# Patient Record
Sex: FEMALE | Race: WHITE | Employment: OTHER | URBAN - METROPOLITAN AREA
[De-identification: names, ages, dates, MRNs, and addresses within clinical notes are randomized per-mention and may not be internally consistent; named-entity substitution may affect disease eponyms.]

---

## 2018-05-09 PROBLEM — M15.9 PRIMARY OSTEOARTHRITIS INVOLVING MULTIPLE JOINTS: Status: ACTIVE | Noted: 2017-09-11

## 2018-05-09 PROBLEM — E66.01 SEVERE OBESITY (BMI 35.0-39.9) WITH COMORBIDITY (HCC): Status: ACTIVE | Noted: 2018-05-09

## 2019-07-16 PROBLEM — Z85.528 HISTORY OF RENAL CELL CARCINOMA: Status: ACTIVE | Noted: 2019-03-12

## 2020-08-11 VITALS — HEIGHT: 64 IN | BODY MASS INDEX: 34.66 KG/M2 | WEIGHT: 203 LBS

## 2020-08-11 RX ORDER — ZOSTER VACCINE RECOMBINANT, ADJUVANTED 50 MCG/0.5
0.5 KIT INTRAMUSCULAR ONCE
COMMUNITY
End: 2021-07-30

## 2020-08-18 ENCOUNTER — OFFICE VISIT (OUTPATIENT)
Dept: ORTHOPEDIC SURGERY | Age: 75
End: 2020-08-18

## 2020-08-18 VITALS — HEIGHT: 64 IN | BODY MASS INDEX: 36.7 KG/M2 | WEIGHT: 215 LBS

## 2020-08-18 DIAGNOSIS — M25.561 RIGHT KNEE PAIN, UNSPECIFIED CHRONICITY: Primary | ICD-10-CM

## 2020-08-18 DIAGNOSIS — M25.561 CHRONIC PAIN OF RIGHT KNEE: ICD-10-CM

## 2020-08-18 DIAGNOSIS — M17.11 PRIMARY OSTEOARTHRITIS OF RIGHT KNEE: Primary | ICD-10-CM

## 2020-08-18 DIAGNOSIS — G89.29 CHRONIC PAIN OF RIGHT KNEE: ICD-10-CM

## 2020-08-18 RX ORDER — OXYCODONE AND ACETAMINOPHEN 5; 325 MG/1; MG/1
1 TABLET ORAL
Qty: 30 TAB | Refills: 0 | Status: SHIPPED | OUTPATIENT
Start: 2020-08-18 | End: 2020-08-25

## 2020-08-18 RX ORDER — CEPHALEXIN 500 MG/1
500 CAPSULE ORAL 4 TIMES DAILY
Qty: 28 CAP | Refills: 0 | Status: SHIPPED | OUTPATIENT
Start: 2020-08-18 | End: 2020-08-25

## 2020-08-18 NOTE — PROGRESS NOTES
[employer] and Sports Medicine  Preop Visit    Subjective: Dani Paz is a 77 y.o. female who presents today for preoperative visit in preparation for upcoming right total knee replacement to be performed by Dr. Mayra Kim, on an outpatient  basis. Xrays and additional studies, as appropriate, have been reviewed. Pt currently without new complaint. Past Medical History:   Diagnosis Date    Arthritis     Colon polyp     Kidney stones     Piercing     Renal cell carcinoma of right kidney ([address] 10/6/95    Unknown Pathological Stage RCC with unknown FG s/p right radical nephrectomy at [facility] in      Renal disorder        Past Surgical History:   Procedure Laterality Date    HX CATARACT REMOVAL      HX CATARACT REMOVAL  10/2013    Right Eye    HX  SECTION      HX CHOLECYSTECTOMY      HX NEPHRECTOMY Right 10/06/95    Dr. Elida Hooker, [facility] [place]    HX TUBAL LIGATION         Current Outpatient Medications   Medication Sig Dispense Refill    cephALEXin (KEFLEX) 500 mg capsule Take 1 Cap by mouth four (4) times daily for 7 days. Start today 28 Cap 0    oxyCODONE-acetaminophen (Percocet) 5-325 mg per tablet Take 1 Tab by mouth every four to six (4-6) hours as needed for Pain for up to 7 days. Max Daily Amount: 6 Tabs. 30 Tab 0    atorvastatin (LIPITOR) 10 mg tablet Take 10 mg by mouth At bedtime.  aspirin delayed-release 81 mg tablet Take  by mouth daily.  omega-3 fatty acids-vitamin e (FISH OIL) 1,000 mg cap Take 1 Cap by mouth.  varicella-zoster recombinant, PF, (Shingrix, PF,) 50 mcg/0.5 mL susr injection 0.5 mL by IntraMUSCular route once.  trospium (SANCTURA) 20 mg tablet Take 1 Tab by mouth two (2) times a day. 90 Tab 3    naproxen sodium (ALEVE) 220 mg cap Take  by mouth.  Glucosamine &Chondroit-MV-Min3 884-079-96-0.5 mg tab Take  by mouth.  multivitamins-minerals-lutein (CENTRUM SILVER) tab Take  by mouth.          No Known Allergies    ROS:    Patient is a pleasant appearing individual, appropriately dressed, well hydrated, well nourished, who is alert, appropriately oriented for age, and in no acute distress with a limp gait and normal affect who does not appear to be in any significant pain. Remainder of ROS as per HPI. Objective:     Physical Exam:  VSS AFEB    Left knee - Neurovascularly intact with good cap refill, full range of motion and full strength, well healed incision noted, no swelling, no erythema, no instability. Right knee - Decrease range of motion with flexion, Some crepitation, Grossly neurovascularly intact, Good cap refill, he does have several small \"bug bites,\" that she has obtained over the last several days that do not appear to be infected and are located primarily on her distal shin and ankle, Moderate swelling, No gross instability, Some quadriceps weakness       Studies to date:    Xrays: And all diagnostic studies have been reviewed    Labs: Reviewed    General Medicine evaluation: Cleared for surgery from PCP standpoint    Addistional preoperative evaluations: Urologic oncology has cleared her from a standpoint of her previous renal cancer. They states she has no contraindications to proceeding with surgery. Post operative Antibiotics: Keflex she will be started on prophylactically today with regard to small bug bites on her shins and ankles  Post operative Pain Medications: Oxycodone  Post operative Blood thinning meds: Aspirin    Post operative medications, DME and physical therapy prescriptions have been provided. (Meds sent to pharmacy)    Assessment:   Primary osteoarthritis of right knee [M17.11]     Jassi Deloen is a 77 y.o. female who is planning to undergo a right total knee replacement to be performed by Dr. Pilar Miller in the near future. We will plan to proceed on an outpatient basis. At this time, they are an appropriate candidate for surgery.  The risks, benefits, complications and alternatives have been outlined with the patient at length and they voice an understanding. Based on the fact that we agree that the potential benefits outweigh the potential lists, we will plan to proceed as discussed. Ms. Marycarmen Maxwell has a reminder for a \"due or due soon\" health maintenance. I have asked that she contact her primary care provider for follow-up on this health maintenance. Plan:   -Proceed with right TKA to be performed by DR. Waller at [facility] on an outpatient basis. -Preoperative instructions have been reviewed with and provided to the patient, at length  -Patient voices understanding that any skin abnormality to the skin at the site of the involved area of planned surgery may result in cancellation and/or postponement of surgery.  -Physical therapy  to start shortly after surgery.  -Follow up 1 week postoperatively with me for close follow up.     Toya Beth, MS, PA-C

## 2020-08-31 ENCOUNTER — OFFICE VISIT (OUTPATIENT)
Dept: ORTHOPEDIC SURGERY | Age: 75
End: 2020-08-31
Payer: MEDICARE

## 2020-08-31 DIAGNOSIS — G89.29 CHRONIC PAIN OF RIGHT KNEE: ICD-10-CM

## 2020-08-31 DIAGNOSIS — M17.11 PRIMARY OSTEOARTHRITIS OF RIGHT KNEE: ICD-10-CM

## 2020-08-31 DIAGNOSIS — M25.561 CHRONIC PAIN OF RIGHT KNEE: ICD-10-CM

## 2020-08-31 DIAGNOSIS — Z96.651 HISTORY OF TOTAL RIGHT KNEE REPLACEMENT: Primary | ICD-10-CM

## 2020-08-31 PROCEDURE — 99024 POSTOP FOLLOW-UP VISIT: CPT | Performed by: PHYSICIAN ASSISTANT

## 2020-08-31 RX ORDER — OXYCODONE AND ACETAMINOPHEN 5; 325 MG/1; MG/1
1 TABLET ORAL
Qty: 30 TAB | Refills: 0 | Status: SHIPPED | OUTPATIENT
Start: 2020-08-31 | End: 2020-09-07

## 2020-08-31 NOTE — PROGRESS NOTES
[facility] and [facility]  Total Knee Replacement Follow up    Subjective: Bla Gill is a 77 y.o. female presents for postop care status post right total knee arthroplasty performed on 24 August 2020 by Dr. Jovana Choi. Pain is generally well controlled. Appetite is returning to normal. Patient has begun physical therapy. Dressing and stockings are in place. Pt has been taking blood thinning medication as recommended. Ambulating without difficulty. No problems with wound. Otherwise without complaint. ROS  Patient is a pleasant appearing individual, appropriately dressed, well hydrated, well nourished, who is alert, appropriately oriented for age, and in no acute distress with a limp gait and normal affect who does not appear to be in any significant pain. Objective:     VSS AFEB  Left knee - Neurovascularly intact with good cap refill, full range of motion and full strength,  no swelling, no erythema, no instability. Right knee - Decrease range of motion with flexion, Some crepitation, Grossly neurovascularly intact, Good cap refill, well healed incision noted, No skin lesion, Moderate swelling, No gross instability, Some quadriceps weakness     Assessment:     History of total right knee replacement [Z96.651]   No orders of the defined types were placed in this encounter. Doing well post operatively. Ms. Jackeline Jovel has a reminder for a \"due or due soon\" health maintenance. I have asked that she contact her primary care provider for follow-up on this health maintenance. Plan:     --During the patient's visit today, the patient was instructed to no longer wear the compression stocking on the unaffected leg. They were instructed that the compression stocking on the affected leg should be continued for a total of 3 weeks postoperatively.   They were also reminded to continue their blood thinning medication for a total of 3 weeks postoperatively as instructed at the time of discharge. Their dressing was taken down and will not need to be replaced. --The patient was instructed that they may now shower and the incision may get wet. The patient was asked not to [facility]" their wound. They are reminded that although they may shower they should avoid baths, hot tubs, lotions, pools, lakes, and springs. Patient was instructed to make sure that when the wound does get wet to dry it very well. They were also reminded to not yet use any creams, lotions, ointments, salves, antibiotic creams, or AAA. Patient was told to leave the surgical tape that is covering the incision intact for another week. At that point it can be removed. It may be slightly easier if a very small thin layer of Vaseline is utilized to break up the glue holding it in place. Once the glue comes off it is imperative they remove the Vaseline. Patient may drive once they are in no pain and on no pain medication. --Patient will have a follow-up appointment in the next several weeks to assess progression in physical therapy and to receive further instruction. Patient was cautioned to be on the look out for increased swelling of the surgical knee, spreading of redness or warmth that persists around the incision site. They were reminded that some swelling or redness following physical therapy is not abnormal.  They were also instructed to be on the look out for drainage and/or pus coming from the incision site. Again they were warned about a fever of 101.5 that does not respond to Tylenol and and inability to bear weight on affected leg. They were also cautioned about an acute increase in pain that persists. If any of these issues should arise they will not hesitate to contact our office for further evaluation and care. Additional pain management was reviewed with the patient and if additional prescription pain medication is required it has been sent to the patient's pharmacy.  Oxycodone was refilled    -Pt to follow up as instructed. 4 weeks sooner should need be. Questions were solicited and answered to the patient's satisfaction and the patient will follow-up as discussed.       Signed By: Rohini Kaur MS, PAAnalyC    August 31, 2020

## 2020-08-31 NOTE — PATIENT INSTRUCTIONS
--During the patient's visit today, the patient was instructed to no longer wear the compression stocking on the unaffected leg. Pt  instructed that the compression stocking on the affected leg should be continued for a total of 3 weeks postoperatively. The patient was also reminded to continue their blood thinning medication (Aspirin) for a total of 3 weeks postoperatively as instructed at the time of discharge. Their dressing was taken down and will not need to be replaced. --The patient was instructed that they may now shower and the incision may get wet. The patient was asked not to [facility]" their wound. The patient was reminded that although they may shower they should avoid baths, hot tubs, oceans, pools, lakes, and springs. Patient was instructed to make sure that when the wound does get wet to dry it very well. Patient was also reminded to not yet use any creams, lotions, ointments, salves, antibiotic creams, or AAA. Patient was told to leave the surgical tape that is covering the incision intact for another week. At that point it can be removed. It may be slightly easier if a very small thin layer of Vaseline is utilized to break up the glue holding it in place. Once the glue comes off it is imperative they remove the Vaseline. Patient may drive once they are in no pain and on no pain medication. --Patient will have a follow-up appointment in the next several weeks to assess progression in physical therapy and to receive further instruction. Patient was cautioned to be on the look out for increased swelling of the surgical knee, spreading of redness or warmth that persists around the incision site. Patient was reminded that some swelling or redness following physical therapy is not abnormal.  They were also instructed to be on the look out for drainage and/or pus coming from the incision site.   Again the patient was warned about a fever of 101.5 that does not respond to Tylenol and an inability to bear weight on affected leg. Pt was also cautioned about an acute increase in pain that persists. If any of these issues should arise the patient will not hesitate to contact our office for further evaluation and care. Additional pain management was reviewed with the patient and if additional prescription pain medication is required it has been sent to the patient's pharmacy. Questions were solicited and answered to the patient's satisfaction and the patient will follow-up as discussed.

## 2020-09-11 ENCOUNTER — TELEPHONE (OUTPATIENT)
Dept: ORTHOPEDIC SURGERY | Age: 75
End: 2020-09-11

## 2020-09-11 DIAGNOSIS — M25.561 CHRONIC PAIN OF RIGHT KNEE: ICD-10-CM

## 2020-09-11 DIAGNOSIS — M17.11 PRIMARY OSTEOARTHRITIS OF RIGHT KNEE: Primary | ICD-10-CM

## 2020-09-11 DIAGNOSIS — Z96.651 HISTORY OF TOTAL RIGHT KNEE REPLACEMENT: ICD-10-CM

## 2020-09-11 DIAGNOSIS — G89.29 CHRONIC PAIN OF RIGHT KNEE: ICD-10-CM

## 2020-09-11 RX ORDER — OXYCODONE AND ACETAMINOPHEN 5; 325 MG/1; MG/1
1 TABLET ORAL
Qty: 30 TAB | Refills: 0 | Status: SHIPPED | OUTPATIENT
Start: 2020-09-11 | End: 2020-09-18

## 2020-09-14 ENCOUNTER — HOSPITAL ENCOUNTER (OUTPATIENT)
Dept: PHYSICAL THERAPY | Age: 75
Discharge: HOME | End: 2020-09-14
Payer: MEDICARE

## 2020-09-14 ENCOUNTER — OFFICE VISIT (OUTPATIENT)
Dept: ORTHOPEDIC SURGERY | Age: 75
End: 2020-09-14
Payer: MEDICARE

## 2020-09-14 DIAGNOSIS — M17.11 PRIMARY OSTEOARTHRITIS OF RIGHT KNEE: ICD-10-CM

## 2020-09-14 DIAGNOSIS — M25.561 CHRONIC PAIN OF RIGHT KNEE: ICD-10-CM

## 2020-09-14 DIAGNOSIS — Z96.651 HISTORY OF TOTAL RIGHT KNEE REPLACEMENT: Primary | ICD-10-CM

## 2020-09-14 DIAGNOSIS — G89.29 CHRONIC PAIN OF RIGHT KNEE: ICD-10-CM

## 2020-09-14 PROCEDURE — 99024 POSTOP FOLLOW-UP VISIT: CPT | Performed by: PHYSICIAN ASSISTANT

## 2020-09-14 PROCEDURE — 97110 THERAPEUTIC EXERCISES: CPT

## 2020-09-14 NOTE — PROGRESS NOTES
PT DAILY TREATMENT NOTE     Patient Name: Hamilton Stage  Date:2020  : 1945  [x]  Patient  Verified  [place]or[place] MED[place] / Plan: [place] MED[place] & [place] / Product Type: Medicare /    In time:1305  Out time:1352  Total Treatment Time (min): 47  Total Timed Codes (min): 47  1:1 Treatment Time (min): 47   Visit #: 7     Treatment Area: Pain in right knee [M25.561]    SUBJECTIVE  Pain Level (0-10 scale): 2  Any medication changes, allergies to medications, adverse drug reactions, diagnosis change, or new procedure performed?: [x] No    [] Yes (see summary sheet for update)  Subjective functional status/changes:   [] No changes reported  Pt. Reported skin sensitivity along incision which prevents her from HEP compliance. Pt. States she walks 2-3x/day \"to keep moving. \"    OBJECTIVE    47 min Therapeutic Exercise:  [] See flow sheet :   Rationale: increase ROM and increase strength to improve the patients ability to step into and out of vehicle and ambulated longer distances. min Patient Education: [x] Review HEP    [] Progressed/Changed HEP based on:   [] positioning   [x] body mechanics   [] transfers   [x] heat/ice application        Other Objective/Functional Measures:  AROM: -14-95 deg  PROM: - deg     Pain Level (0-10 scale) post treatment: 0    ASSESSMENT/Changes in Function: Pt. Continues to lack in both knee flexion and extension range of motion having a low threshold for pain. Focused primary on improving range of motion   via PROM and AAROM exercises. Quad strengthening and motor control address with muscle fatigued present throughout. Patient will continue to benefit from skilled PT services to address functional mobility deficits, address ROM deficits and address strength deficits to attain remaining goals.      [x]  See Plan of Care  []  See progress note/recertification  []  See Discharge Summary         Progress towards goals / Updated goals:      PLAN  [] Upgrade activities as tolerated     [x]  Continue plan of care  []  Update interventions per flow sheet       []  Discharge due to:_  []  Other:_      Belinda Hester 9/14/2020  2:17 PM

## 2020-09-14 NOTE — PROGRESS NOTES
[facility]  General Follow up    Subjective: Cory Clayton is a 77 y.o. female presents for follow up after right total knee arthroplasty performed on 24 August 2020. Pain is generally well controlled. Patient is generally without complaint. She states she is \"doing great. \"    ROS  Patient is a pleasant appearing individual, appropriately dressed, well hydrated, well nourished, who is alert, appropriately oriented for age, and in no acute distress with a normal gait and normal affect who does not appear to be in any significant pain. Objective:     VSS, AFEB      Physical Exam  Right knee - Neurovascularly intact with good cap refill, full range of motion and full strength, well healed incision noted, no swelling, no erythema, no instability. Left knee - Decrease range of motion with flexion, Some crepitation, Grossly neurovascularly intact, Good cap refill, No skin lesion, Moderate swelling, No gross instability, Some quadriceps weakness    Assessment:     History of total right knee replacement [Z96.651]   Problem List Items Addressed This Visit     None      Visit Diagnoses     History of total right knee replacement    -  Primary    Primary osteoarthritis of right knee        Chronic pain of right knee             Doing well overall. Ms. Tonya Cerrato has a reminder for a \"due or due soon\" health maintenance. I have asked that she contact her primary care provider for follow-up on this health maintenance. Plan:     --Patient is doing extremely well. She will continue physical therapy and working on activities of daily living. She will follow-up here in the next 3 to 6 months sooner should need be. Questions were solicited and answered to the patient's satisfaction and the patient will follow-up as discussed.       Signed By: Sesar Gutiérrez PA-C     September 14, 2020

## 2020-09-16 ENCOUNTER — HOSPITAL ENCOUNTER (OUTPATIENT)
Dept: PHYSICAL THERAPY | Age: 75
Discharge: HOME | End: 2020-09-16
Payer: MEDICARE

## 2020-09-16 PROCEDURE — 97110 THERAPEUTIC EXERCISES: CPT

## 2020-09-16 NOTE — PROGRESS NOTES
PT DAILY TREATMENT NOTE     Patient Name: Alonzo De Luna  Date:2020  : 1945  [x]  Patient  Verified  Payor: [place] MEDICARE / Plan: [place] MEDICARE PART [place] & B / Product Type: Medicare /    In time:1306  Out time:1353  Total Treatment Time (min): 52  Total Timed Codes (min): 52    Treatment Area: Pain in right knee [M25.561]    SUBJECTIVE  Pain Level (0-10 scale): 1/10  Any medication changes, allergies to medications, adverse drug reactions, diagnosis change, or new procedure performed?: [x] No    [] Yes (see summary sheet for update)  Subjective functional status/changes:   [] No changes reported  Pt reported that her pain was very minimal today, and has been walking around home without AD at the current time. States that she feels she is 90% steady without cane at home. OBJECTIVE  Modality rationale: decrease pain, increase tissue extensibility and increase muscle contraction/control to improve the patients ability to return to prior level of function.    Min Type Additional Details    [] Estim: []Att   []Unatt  []TENS instruct                 []IFC  []Premod []NMES                       []Other:  []w/US   []w/ice   []w/heat  Position:  Location:    []  Traction: [] Cervical       []Lumbar                       [] Prone          []Supine                       []Intermittent   []Continuous Lbs:  [] before manual  [] after manual    []  Ultrasound: []Continuous   [] Pulsed                           []1MHz   []3MHz Location:  W/cm2:    []  Iontophoresis with dexamethasone         Location: [] Take home patch   [] In clinic   10 []  Ice     [x]  heat  []  Ice massage Position: seated extension  Location:knee    []  Vasopneumatic Device Pressure: [] lo [] med [] hi   Temp: [] lo [] med [] hi   [x] Skin assessment post-treatment:  [x]intact []redness- no adverse reaction       []redness  adverse reaction:       42 min Therapeutic Exercise:  [x] See flow sheet :   Rationale: increase ROM, increase strength, improve coordination, improve balance and increase proprioception to improve the patients ability to return to every day activity. Other Objective/Functional Measures: added mini squats, HR, amb w/o AD, and increased extension stretch to 2#, 2x3 min. Pain Level (0-10 scale) post treatment: 1/10    ASSESSMENT/Changes in Function: Pt making progress with gait and endurance for distance. Patient will continue to benefit from skilled PT services to address ROM deficits, address strength deficits, analyze and cue movement patterns and instruct in home and community integration to attain remaining goals. [x]  See Plan of Care  []  See progress note/recertification  []  See Discharge Summary         Progress towards goals / Updated goals:  Cont POC to improve on deficits.     PLAN  []  Upgrade activities as tolerated     [x]  Continue plan of care  []  Update interventions per flow sheet       []  Discharge due to:_  []  Other:_      MELANIA Mckee  9/16/2020  3:49 PM

## 2020-09-18 ENCOUNTER — HOSPITAL ENCOUNTER (OUTPATIENT)
Dept: VASCULAR SURGERY | Age: 75
Discharge: HOME | End: 2020-09-18
Attending: PHYSICIAN ASSISTANT
Payer: MEDICARE

## 2020-09-18 ENCOUNTER — HOSPITAL ENCOUNTER (EMERGENCY)
Age: 75
Discharge: HOME | End: 2020-09-18
Attending: INTERNAL MEDICINE
Payer: MEDICARE

## 2020-09-18 ENCOUNTER — DOCUMENTATION ONLY (OUTPATIENT)
Dept: ORTHOPEDIC SURGERY | Age: 75
End: 2020-09-18

## 2020-09-18 ENCOUNTER — HOSPITAL ENCOUNTER (OUTPATIENT)
Dept: PHYSICAL THERAPY | Age: 75
Discharge: HOME | End: 2020-09-18
Payer: MEDICARE

## 2020-09-18 VITALS
WEIGHT: 215 LBS | OXYGEN SATURATION: 100 % | TEMPERATURE: 98.8 F | RESPIRATION RATE: 17 BRPM | DIASTOLIC BLOOD PRESSURE: 81 MMHG | HEART RATE: 80 BPM | BODY MASS INDEX: 36.7 KG/M2 | SYSTOLIC BLOOD PRESSURE: 149 MMHG | HEIGHT: 64 IN

## 2020-09-18 DIAGNOSIS — M79.604 LEG PAIN, RIGHT: ICD-10-CM

## 2020-09-18 DIAGNOSIS — M79.661 RIGHT CALF PAIN: Primary | ICD-10-CM

## 2020-09-18 DIAGNOSIS — I82.411 DVT OF DEEP FEMORAL VEIN, RIGHT (HCC): Primary | ICD-10-CM

## 2020-09-18 DIAGNOSIS — M25.561 RIGHT KNEE PAIN, UNSPECIFIED CHRONICITY: Primary | ICD-10-CM

## 2020-09-18 PROCEDURE — 97110 THERAPEUTIC EXERCISES: CPT

## 2020-09-18 PROCEDURE — 99283 EMERGENCY DEPT VISIT LOW MDM: CPT

## 2020-09-18 PROCEDURE — 93971 EXTREMITY STUDY: CPT

## 2020-09-18 RX ORDER — RIVAROXABAN 15 MG-20MG
KIT ORAL
Qty: 1 DOSE PACK | Refills: 0 | Status: SHIPPED | OUTPATIENT
Start: 2020-09-18 | End: 2021-09-21

## 2020-09-18 NOTE — PROGRESS NOTES
Patient is status post right TKA. I had the pleasure of seeing her in the office on 14 September 2020. At that time she had completed 3 weeks of postoperative care since her surgery was performed on 24 August 2020. The patient has no history of DVT or pulmonary embolus. No history of clotting disorder. She was compliant with twice a day aspirin 325 mg.  Given the fact that she was 3 weeks postop her aspirin was discontinued. She was quite mobile and doing quite well in physical therapy. She reported to physical therapy today complaining of right lower extremity tightness. Per physical therapist patient had positive Homans sign and much less mobility than she has had during her last several visits. I went and saw her while she was in the physical therapy department and her mobility was decreased and she still complained of discomfort both with palpation and movement. I do believe it be in the patient's best interest to obtain a ultrasound of her bilateral lower extremities to rule out DVT. The patient denies any shortness of breath or difficulty breathing. She has no chest symptomatology. My index of suspicion is low but given the fact that we just recently stopped the aspirin again I do think it would be prudent to perform the testing. The patient agrees. Signs of worsening were reviewed and should they occur she will not hesitate to report directly to the emergency department for further evaluation and care. Our scheduling folks have set up her ultrasound and her order has been given to physical therapy to forward to her. She will report immediately for further evaluation.     Yulissa Hill MS, PAAnalyC

## 2020-09-18 NOTE — ED PROVIDER NOTES
EMERGENCY DEPARTMENT HISTORY AND PHYSICAL EXAM      Date: 9/18/2020  Patient Name: Christopher Godfrey    History of Presenting Illness     Chief Complaint   Patient presents with    Leg Pain       History Provided By: Patient and Patient's Surgeon    HPI: Christopher Godfrey, 77 y.o. female presents to the ED with cc of diagnosed DVT in her right femoral artery after knee surgery. Dr. Butch Lizama, who did pt's surgery, called and informed staff that the patient was just today diagnosed with a DVT in her right knee. Pt recently had surgery performed on the knee and is still experiencing tenderness and swelling in the calf. There is no present tenseness. Patient denies taking any blood thinners but states that she does take a regular strength aspirin for general heart health and has had surgery to remove cancerous kidney growth. There are no other complaints, changes, or physical findings at this time. PCP: Quin Skinner MD    No current facility-administered medications on file prior to encounter. Current Outpatient Medications on File Prior to Encounter   Medication Sig Dispense Refill    atorvastatin (LIPITOR) 10 mg tablet Take 10 mg by mouth At bedtime.  Glucosamine &Chondroit-MV-Min3 863-057-92-0.5 mg tab Take  by mouth.  multivitamins-minerals-lutein (CENTRUM SILVER) tab Take  by mouth.  omega-3 fatty acids-vitamin e (FISH OIL) 1,000 mg cap Take 1 Cap by mouth.  oxyCODONE-acetaminophen (Percocet) 5-325 mg per tablet Take 1 Tab by mouth every four to six (4-6) hours as needed for Pain for up to 7 days. Max Daily Amount: 6 Tabs. 30 Tab 0    varicella-zoster recombinant, PF, (Shingrix, PF,) 50 mcg/0.5 mL susr injection 0.5 mL by IntraMUSCular route once.  trospium (SANCTURA) 20 mg tablet Take 1 Tab by mouth two (2) times a day. 90 Tab 3    [DISCONTINUED] naproxen sodium (ALEVE) 220 mg cap Take  by mouth.       [DISCONTINUED] aspirin delayed-release 81 mg tablet Take  by mouth two (2) times a day. Past History     Past Medical History:  Past Medical History:   Diagnosis Date    Arthritis     Colon polyp     Kidney stones     Piercing     Renal cell carcinoma of right kidney ([address] 10/6/95    Unknown Pathological Stage RCC with unknown FG s/p right radical nephrectomy at [facility] in      Renal disorder        Past Surgical History:  Past Surgical History:   Procedure Laterality Date    HX CATARACT REMOVAL      HX CATARACT REMOVAL  10/2013    Right Eye    HX  SECTION      HX CHOLECYSTECTOMY      HX NEPHRECTOMY Right 10/06/95    Dr. Salty Scott, [facility]    HX TUBAL LIGATION         Family History:  Family History   Problem Relation Age of Onset    Heart Disease Mother     Cancer Father     Cancer Paternal Aunt     Cancer Paternal Uncle        Social History:  Social History     Tobacco Use    Smoking status: Never Smoker    Smokeless tobacco: Never Used   Substance Use Topics    Alcohol use: No    Drug use: No       Allergies:  No Known Allergies      Review of Systems   Review of Systems   Constitutional: Negative for diaphoresis, fatigue and fever. HENT: Negative for ear pain, rhinorrhea and sore throat. Eyes: Negative for photophobia, pain and redness. Respiratory: Negative for cough, chest tightness and shortness of breath. Cardiovascular: Negative for chest pain, palpitations and leg swelling. Gastrointestinal: Negative for abdominal pain, diarrhea, nausea and vomiting. Endocrine: Negative for polydipsia, polyphagia and polyuria. Genitourinary: Negative for frequency, hematuria and pelvic pain. Musculoskeletal: Negative for arthralgias, back pain, joint swelling, neck pain and neck stiffness. Swelling and tenderness in her right calf. Tenseness not present. Skin: Negative for color change, pallor, rash and wound. Allergic/Immunologic: Negative for environmental allergies, food allergies and immunocompromised state.    Neurological: Negative for dizziness, seizures, numbness and headaches. Hematological: Negative for adenopathy. Does not bruise/bleed easily. Psychiatric/Behavioral: Negative for confusion and self-injury. The patient is not nervous/anxious. Physical Exam   Physical Exam  Constitutional:       General: She is not in acute distress. Appearance: Normal appearance. She is normal weight. She is not ill-appearing. HENT:      Head: Normocephalic and atraumatic. Right Ear: Tympanic membrane, ear canal and external ear normal.      Left Ear: Tympanic membrane, ear canal and external ear normal.      Nose: Nose normal.      Mouth/Throat:      Mouth: Mucous membranes are moist.      Pharynx: Oropharynx is clear. Eyes:      Extraocular Movements: Extraocular movements intact. Conjunctiva/sclera: Conjunctivae normal.      Pupils: Pupils are equal, round, and reactive to light. Neck:      Musculoskeletal: Normal range of motion and neck supple. No neck rigidity or muscular tenderness. Cardiovascular:      Rate and Rhythm: Normal rate and regular rhythm. Pulses: Normal pulses. Heart sounds: Normal heart sounds. No murmur. No friction rub. No gallop. Pulmonary:      Effort: Pulmonary effort is normal. No respiratory distress. Breath sounds: Normal breath sounds. Chest:      Chest wall: No tenderness. Abdominal:      General: Bowel sounds are normal.      Palpations: Abdomen is soft. There is no mass. Tenderness: There is no abdominal tenderness. Musculoskeletal: Normal range of motion. General: No swelling. Right knee: She exhibits swelling and erythema. She exhibits no ecchymosis. Tenderness found. Legs:    Skin:     General: Skin is warm. Coloration: Skin is not pale. Findings: No bruising or erythema. Neurological:      Mental Status: She is alert and oriented to person, place, and time. Motor: No weakness.    Psychiatric:         Mood and Affect: Mood normal.         Behavior: Behavior normal.         Thought Content: Thought content normal.         Judgment: Judgment normal.         Diagnostic Study Results     Labs -   No results found for this or any previous visit (from the past 12 hour(s)). Radiologic Studies -   No orders to display     CT Results  (Last 48 hours)    None        CXR Results  (Last 48 hours)    None          Medical Decision Making   I am the first provider for this patient. I reviewed the vital signs, available nursing notes, past medical history, past surgical history, family history and social history. Vital Signs-Reviewed the patient's vital signs. Patient Vitals for the past 12 hrs:   Temp Pulse Resp BP SpO2   09/18/20 1624 98.8 °F (37.1 °C) 79 18 (!) 155/75 100 %       Records Reviewed: Nursing Notes    Provider Notes (Medical Decision Making):    Pt had right TKR by Dr Meghan Farr on 8/24/20 and developed right leg swelling and pain. Sent to hospitals today and [facility] called Dr Meghan Farr and told him that pt has right femoral DVT. No iliofemoral; no phlegmasia clinically; no renal disease or other co morbidities to prevent OP treatment. Advsied to stop asa and naproxen. Will start on xarelto 15 mg bid x 21d and Dr Meghan Farr to arrange follow up with vascular      ED Course:   Initial assessment performed. The patients presenting problems have been discussed, and they are in agreement with the care plan formulated and outlined with them. I have encouraged them to ask questions as they arise throughout their visit. Disposition:  Discharged     Remove if not discharged  DISCHARGE PLAN:  1. Current Discharge Medication List      START taking these medications    Details   rivaroxaban (Xarelto) 15 mg (42)- 20 mg (9) DsPk Take one 15 mg tablet twice a day with food for the first 21 days. Then, take one 20 mg tablet once a day with food for 9 days.   Qty: 1 Dose Pack, Refills: 0         STOP taking these medications       naproxen sodium (ALEVE) 220 mg cap Comments:   Reason for Stopping:         aspirin delayed-release 81 mg tablet Comments:   Reason for Stoppin.   Follow-up Information     Follow up With Specialties Details Why Contact Info    Mackenzie Chang MD Orthopedic Surgery In 3 days  [address]  [phone]          3. Return to ED if worse     Diagnosis     Clinical Impression:   1. DVT of deep femoral vein, right ([address]        Attestations:    By signing my name below, I, Caty Shah, attest that this documentation has been prepared under the direction and in presence of Dr. Luis Myers on 20. Electronically signed: Caty Shah, 20, 4:57 Blane Zapata MD    Please note that this dictation was completed with [employer], the computer voice recognition software. Quite often unanticipated grammatical, syntax, homophones, and other interpretive errors are inadvertently transcribed by the computer software. Please disregard these errors. Please excuse any errors that have escaped final proofreading. Thank you.

## 2020-09-18 NOTE — ED NOTES
I have reviewed discharge instructions with the patient. The patient verbalized understanding. Patient informed to  prescription for Xarelto at [facility]. Patient assisted to POV via wheelchair.

## 2020-09-18 NOTE — ED TRIAGE NOTES
Pt was sent to  ED by Dr Sylvie Liang. the patient had total knee replacement on August 24 and she started with right leg swelling approx 2 weeks ago. Pt had doppler done today and was found to have DVT in right leg.    Pt was sent to ED for treatment

## 2020-09-18 NOTE — PROGRESS NOTES
PT DAILY TREATMENT NOTE     Patient Name: Lita Rolon  Date:2020  : 1945  [x]  Patient  Verified  [place]or: [place] MEDICARE / Plan: [place] MEDICARE PART A & B / Product Type: Medicare /    In time:1256  Out time:1347   Total Treatment Time (min): 51  Total Timed Codes (min): 41  1:1 Treatment Time (min): 41       Treatment Area: Pain in right knee [M25.561]    SUBJECTIVE  Pt reports 1/10 pain in R knee upon arrival today, enters with [facility] & states that she can tell things are slowly getting better. Any medication changes, allergies to medications, adverse drug reactions, diagnosis change, or new procedure performed?: [x] No    [] Yes (see summary sheet for update)        OBJECTIVE  Modality rationale: increase tissue extensibility to improve the patients ability to participate in TE   Min Type Additional Details    [] Estim: []Att   []Unatt  []TENS instruct                 []IFC  []Premod []NMES                       []Other:  []w/US   []w/ice   []w/heat  Position:  Location:    []  Traction: [] Cervical       []Lumbar                       [] Prone          []Supine                       []Intermittent   []Continuous Lbs:  [] before manual  [] after manual    []  Ultrasound: []Continuous   [] Pulsed                           []1MHz   []3MHz Location:  W/cm2:        10 []  Ice     [x]  heat  []  Ice massage Position: R knee  Location: seated    []  Vasopneumatic Device Pressure: [] lo [] med [] hi   Temp: [] lo [] med [] hi   [] Skin assessment post-treatment:  []intact []redness- no adverse reaction       []redness  adverse reaction:       29 min Therapeutic Exercise:  [x] See flow sheet :   Rationale: increase ROM, increase strength and improve balance to improve the patients ability to move about safely & independently. Session initiated with MHP in heel prop position followed by active warm up on stepper to promote ROM & endurance. Pt able to tolerated for increased duration without complaints. Pt performed all activities as listed on flowsheet. Prior to performing hamstring curl pt verbalized persistent tension & intermittent pain in posterior R knee. Palpation and Belle's sign consistent with concerns of DVT. Anand Gonzalez PA-C notified & had PVL ordered to rule out DVT. 12 min Patient Education: [x] Review HEP    [] Progressed/Changed HEP based on:   [] positioning   [] body mechanics   [] transfers   [] heat/ice application          Pain Level (0-10 scale) post treatment: 1/10    ASSESSMENT/Changes in Function: see above    Patient will continue to benefit from skilled PT services to modify and progress therapeutic interventions, address functional mobility deficits, address ROM deficits and address strength deficits to attain remaining goals.      [x]  See Plan of Care  []  See progress note/recertification  []  See Discharge Summary             PLAN  []  Upgrade activities as tolerated     []  Continue plan of care  []  Update interventions per flow sheet       []  Discharge due to:_  []  Other:_      Sloan Pallas, PT, DPT 9/18/2020  12:55 PM

## 2020-09-21 ENCOUNTER — APPOINTMENT (OUTPATIENT)
Dept: PHYSICAL THERAPY | Age: 75
End: 2020-09-21
Payer: MEDICARE

## 2020-09-22 ENCOUNTER — OFFICE VISIT (OUTPATIENT)
Dept: ORTHOPEDIC SURGERY | Age: 75
End: 2020-09-22
Payer: MEDICARE

## 2020-09-22 VITALS — TEMPERATURE: 97.4 F

## 2020-09-22 DIAGNOSIS — M17.11 PRIMARY OSTEOARTHRITIS OF RIGHT KNEE: Primary | ICD-10-CM

## 2020-09-22 PROCEDURE — 99024 POSTOP FOLLOW-UP VISIT: CPT | Performed by: ORTHOPAEDIC SURGERY

## 2020-09-22 NOTE — PROGRESS NOTES
Name: Brenden Wadsworth    : 1945  Service Dept: [address] [place] [address] MEDICINE       Chief Complaint   Patient presents with    Surgical Follow-up        Patient's Pharmacies:    [address]  [place] [address]  Phone: [phone] Fax: [phone]       Visit Vitals  Temp 97.4 °F (36.3 °C)        No Known Allergies     Current Outpatient Medications   Medication Sig Dispense Refill    rivaroxaban (Xarelto) 15 mg (42)- 20 mg (9) DsPk Take one 15 mg tablet twice a day with food for the first 21 days. Then, take one 20 mg tablet once a day with food for 9 days. 1 Dose Pack 0    varicella-zoster recombinant, PF, (Shingrix, PF,) 50 mcg/0.5 mL susr injection 0.5 mL by IntraMUSCular route once.  atorvastatin (LIPITOR) 10 mg tablet Take 10 mg by mouth At bedtime.  trospium (SANCTURA) 20 mg tablet Take 1 Tab by mouth two (2) times a day. 90 Tab 3    Glucosamine &Chondroit-MV-Min3 255-295-02-0.5 mg tab Take  by mouth.  multivitamins-minerals-lutein (CENTRUM SILVER) tab Take  by mouth.  omega-3 fatty acids-vitamin e (FISH OIL) 1,000 mg cap Take 1 Cap by mouth. Patient Active Problem List   Diagnosis Code    Diverticulosis of intestine K57.90    Dysplastic colon polyp K63.5    Cramps of lower extremity R25.2    Adiposity E66.9    Hyperlipidemia E78.5    Primary localized osteoarthrosis M19.91    Renal cell cancer ([address] C64.9    Simple renal cyst N28.1    Vitamin D deficiency E55.9    Severe obesity (BMI 35.0-39. 9) with comorbidity ([address] E66.01    Primary osteoarthritis involving multiple joints M89.49    History of renal cell carcinoma Z85.528        Family History   Problem Relation Age of Onset    Heart Disease Mother     Cancer Father     Cancer Paternal Aunt     Cancer Paternal Uncle         Social History     Socioeconomic History    Marital status:      Spouse name: Not on file    Number of children: Not on file    Years of education: Not on file    Highest education level: Not on file   Tobacco Use    Smoking status: Never Smoker    Smokeless tobacco: Never Used   Substance and Sexual Activity    Alcohol use: No    Drug use: No   Other Topics Concern        Past Surgical History:   Procedure Laterality Date    HX CATARACT REMOVAL      HX CATARACT REMOVAL  10/2013    Right Eye    HX  SECTION      HX CHOLECYSTECTOMY      HX NEPHRECTOMY Right 10/06/95    Dr. Asia Morataya, [facility]    HX TUBAL LIGATION          Past Medical History:   Diagnosis Date    Arthritis     Colon polyp     Kidney stones     Piercing     Renal cell carcinoma of right kidney ([address] 10/6/95    Unknown Pathological Stage RCC with unknown FG s/p right radical nephrectomy at [facility] in      Renal disorder         I have reviewed and agree with [address] [place] [address] Nw and ROS and intake form in chart and the record. Encounter Diagnoses     ICD-10-CM ICD-9-CM   1. Primary osteoarthritis of right knee  M17.11 715.16      Right knee - Neurovascularly intact with good cap refill, full range of motion and full strength, well healed incision noted, no swelling, no erythema, no instability. Left knee - Decrease range of motion with flexion, Some crepitation, Grossly neurovascularly intact, Good cap refill, No skin lesion, Moderate swelling, No gross instability, Some quadriceps weakness      Scribed by Bambi Arteaga as dictated by [facility] OLY Woo MD.    HPI:  The patient is status post right total knee replacement, doing well. Surgery was on 2020. She had an ultrasound, which showed she does have a blood clot. So, she is seeing Dr. Terrence Miller for it. Assessment/Plan:  She is going to hold off on physical therapy for 4 weeks. We will see her back in about 4 weeks for final check and then she will go to yearly appointment, and Dr. Terrence Miller will be managing her DVT. Return to Office:    Follow-up Information None             Documentation True and Accepted Chiki Frey MD

## 2020-09-22 NOTE — PROGRESS NOTES
Providers protocol for the intake nurse to complete in patient's chart: Kalangel Stage presents today for   Chief Complaint   Patient presents with    Surgical Follow-up     Incomplete intake information available.     Reason for visit  Pain assessment   Height  Weight    Temperature is taken by [facility]  Travel Screening done by [facility]    Provider will complete the patient's chart

## 2020-09-22 NOTE — PATIENT INSTRUCTIONS
Knee Pain or Injury: Care Instructions  Your Care Instructions     Injuries are a common cause of knee problems. Sudden (acute) injuries may be caused by a direct blow to the knee. They can also be caused by abnormal twisting, bending, or falling on the knee. Pain, bruising, or swelling may be severe, and may start within minutes of the injury. Overuse is another cause of knee pain. Other causes are climbing stairs, kneeling, and other activities that use the knee. Everyday wear and tear, especially as you get older, also can cause knee pain. Rest, along with home treatment, often relieves pain and allows your knee to heal. If you have a serious knee injury, you may need tests and treatment. Follow-up care is a key part of your treatment and safety. Be sure to make and go to all appointments, and call your doctor if you are having problems. It's also a good idea to know your test results and keep a list of the medicines you take. How can you care for yourself at home? · Be safe with medicines. Read and follow all instructions on the label. ? If the doctor gave you a prescription medicine for pain, take it as prescribed. ? If you are not taking a prescription pain medicine, ask your doctor if you can take an over-the-counter medicine. · Rest and protect your knee. Take a break from any activity that may cause pain. · Put ice or a cold pack on your knee for 10 to 20 minutes at a time. Put a thin cloth between the ice and your skin. · Prop up a sore knee on a pillow when you ice it or anytime you sit or lie down for the next 3 days. Try to keep it above the level of your heart. This will help reduce swelling. · If your knee is not swollen, you can put moist heat, a heating pad, or a warm cloth on your knee. · If your doctor recommends an elastic bandage, sleeve, or other type of support for your knee, wear it as directed.   · Follow your doctor's instructions about how much weight you can put on your leg. Use a cane, crutches, or a walker as instructed. · Follow your doctor's instructions about activity during your healing process. If you can do mild exercise, slowly increase your activity. · Reach and stay at a healthy weight. Extra weight can strain the joints, especially the knees and hips, and make the pain worse. Losing even a few pounds may help. When should you call for help? Call 911 anytime you think you may need emergency care. For example, call if:    · You have symptoms of a blood clot in your lung (called a pulmonary embolism). These may include:  ? Sudden chest pain. ? Trouble breathing. ? Coughing up blood. Call your doctor now or seek immediate medical care if:    · You have severe or increasing pain.     · Your leg or foot turns cold or changes color.     · You cannot stand or put weight on your knee.     · Your knee looks twisted or bent out of shape.     · You cannot move your knee.     · You have signs of infection, such as:  ? Increased pain, swelling, warmth, or redness. ? Red streaks leading from the knee. ? Pus draining from a place on your knee. ? A fever.     · You have signs of a blood clot in your leg (called a deep vein thrombosis), such as:  ? Pain in your calf, back of the knee, thigh, or groin. ? Redness and swelling in your leg or groin. Watch closely for changes in your health, and be sure to contact your doctor if:    · You have tingling, weakness, or numbness in your knee.     · You have any new symptoms, such as swelling.     · You have bruises from a knee injury that last longer than 2 weeks.     · You do not get better as expected. Where can you learn more? Go to http://jeff-jennie.info/  Enter K195 in the search box to learn more about \"Knee Pain or Injury: Care Instructions. \"  Current as of: June 26, 2019               Content Version: 12.6  © 2434-2531 [employer].    Care instructions adapted under license by [employer] (which disclaims liability or warranty for this information). If you have questions about a medical condition or this instruction, always ask your healthcare professional. [address] any warranty or liability for your use of this information.

## 2020-09-23 ENCOUNTER — APPOINTMENT (OUTPATIENT)
Dept: PHYSICAL THERAPY | Age: 75
End: 2020-09-23
Payer: MEDICARE

## 2020-10-06 ENCOUNTER — HOSPITAL ENCOUNTER (OUTPATIENT)
Dept: PHYSICAL THERAPY | Age: 75
Discharge: HOME | End: 2020-10-06
Payer: MEDICARE

## 2020-10-06 PROCEDURE — 97110 THERAPEUTIC EXERCISES: CPT

## 2020-10-06 NOTE — PROGRESS NOTES
PT DAILY TREATMENT NOTE     Patient Name: Larisa Newell  Date:10/6/2020  : 1945  [x]  Patient  Verified  Payor: [place] MEDICARE / Plan: VA MEDICARE PART A & B / Product Type: Medicare /    In time:1302 Out time:1405  Total Treatment Time (min): 63  Total Timed Codes (min): 48    Visit #: 10    Treatment Area: Pain in right knee [M25.561]    SUBJECTIVE  Pain Level (0-10 scale): 0/10  Any medication changes, allergies to medications, adverse drug reactions, diagnosis change, or new procedure performed?: [x] No    [] Yes (see summary sheet for update)  Subjective functional status/changes:   [] No changes reported  Pt entered clinic with no new c/o and no AD. States physician cleared her to return to PT as she does still have blood clots but they are minimal, one under knee cap and one in groin that are small. Pt also reports that she is taking medication to disburse those as well. OBJECTIVE  Modality rationale: increase tissue extensibility to improve the patients ability to perform daily activity.     Min Type Additional Details    [] Estim: []Att   []Unatt  []TENS instruct                 []IFC  []Premod []NMES                       []Other:  []w/US   []w/ice   []w/heat  Position:  Location:    []  Traction: [] Cervical       []Lumbar                       [] Prone          []Supine                       []Intermittent   []Continuous Lbs:  [] before manual  [] after manual    []  Ultrasound: []Continuous   [] Pulsed                           []1MHz   []3MHz Location:  W/cm2:    []  Iontophoresis with dexamethasone         Location: [] Take home patch   [] In clinic   10 []  Ice     [x]  heat  []  Ice massage Position:seated  Location:R knee    []  Vasopneumatic Device Pressure: [] lo [] med [] hi   Temp: [] lo [] med [] hi   [x] Skin assessment post-treatment:  [x]intact [x]redness- no adverse reaction       []redness  adverse reaction:       53 min Therapeutic Exercise:  [x] See flow sheet : Rationale: increase ROM, increase strength and improve balance to return to prior level of function. Pt able to perform all exercises that she was doing everything before last session. No pain with any exercises performed today. min Patient Education: [x] Review HEP    [] Progressed/Changed HEP based on:   [] positioning   [] body mechanics   [] transfers   [] heat/ice application        Other Objective/Functional Measures: see flow sheet    Pain Level (0-10 scale) post treatment: 0/10    ASSESSMENT/Changes in Function: Pt making excellent progress, not needed next session for progress note to MD.Flexion w strap (PROM) 99 today. Patient will continue to benefit from skilled PT services to address ROM deficits, address strength deficits and analyze and address soft tissue restrictions to attain remaining goals.      [x]  See Plan of Care  []  See progress note/recertification  []  See Discharge Summary        PLAN  []  Upgrade activities as tolerated     [x]  Continue plan of care  []  Update interventions per flow sheet       []  Discharge due to:_  []  Other:_      MELANIA Thomas  10/6/2020  3:51 PM

## 2020-10-08 ENCOUNTER — HOSPITAL ENCOUNTER (OUTPATIENT)
Dept: PHYSICAL THERAPY | Age: 75
Discharge: HOME | End: 2020-10-08
Payer: MEDICARE

## 2020-10-08 ENCOUNTER — TELEPHONE (OUTPATIENT)
Dept: ORTHOPEDIC SURGERY | Age: 75
End: 2020-10-08

## 2020-10-08 DIAGNOSIS — M25.561 ACUTE PAIN OF RIGHT KNEE: Primary | ICD-10-CM

## 2020-10-08 PROCEDURE — 97110 THERAPEUTIC EXERCISES: CPT

## 2020-10-08 PROCEDURE — 97016 VASOPNEUMATIC DEVICE THERAPY: CPT

## 2020-10-08 RX ORDER — OXYCODONE AND ACETAMINOPHEN 5; 325 MG/1; MG/1
1 TABLET ORAL
Qty: 30 TAB | Refills: 0 | Status: SHIPPED | OUTPATIENT
Start: 2020-10-08 | End: 2020-10-15

## 2020-10-08 NOTE — PROGRESS NOTES
PT DAILY TREATMENT NOTE     Patient Name: Saurav Bardley  Date:10/8/2020  : 1945  [x]  Patient  Verified  Payor: [place] MEDICARE / Plan: VA MEDICARE PART A & B / Product Type: Medicare /    In time:1048  Out time:1152  Total Treatment Time (min): 64  Total Timed Codes (min): 44  1:1 Treatment Time (min): 44       Treatment Area: Pain in right knee [M25.561]    SUBJECTIVE  Pt enters today without AD, states that she feels like her knee is getting stronger and able to get in/out of vehicle better now. Pain Level (0-10 scale): 0/10, stiffness reported  Any medication changes, allergies to medications, adverse drug reactions, diagnosis change, or new procedure performed?: [x] No    [] Yes (see summary sheet for update)        OBJECTIVE  Modality rationale: increase tissue extensibility to improve the patients ability to participate in session   Min Type Additional Details    [] Estim: []Att   []Unatt  []TENS instruct                 []IFC  []Premod []NMES                       []Other:  []w/US   []w/ice   []w/heat  Position:  Location:    []  Traction: [] Cervical       []Lumbar                       [] Prone          []Supine                       []Intermittent   []Continuous Lbs:  [] before manual  [] after manual    []  Ultrasound: []Continuous   [] Pulsed                           []1MHz   []3MHz Location:  W/cm2:        10 []  Ice     [x]  heat  []  Ice massage Position: seated w/ heel prop  Location: R knee   10 []  Vasopneumatic Device Pressure: [x] lo [] med [] hi   Temp: [] lo [x] med [] hi       44 min Therapeutic Exercise:  [x] See flow sheet :   Rationale: increase ROM, increase strength and improve balance to improve the patients ability to maximize pain-free daily activity & restoration of PLOF  Session initiated with MHP followed by active warm up on stepper without adverse reaction.                With TE Patient Education: [x] Review HEP    [x] Progressed/Changed HEP based on:   [] positioning   [] body mechanics   [] transfers   [x] heat/ice application          Pain Level (0-10 scale) post treatment: 0/10    ASSESSMENT/Changes in Function: Brief reassessment conducted today to include knee ROM, LE strength, functional mobility and independence. Improvements noted in all areas and plan to include manual MFR and static stretching to address deficits. Patient will continue to benefit from skilled PT services to modify and progress therapeutic interventions, address functional mobility deficits, address ROM deficits, address strength deficits, analyze and address soft tissue restrictions and assess and modify postural abnormalities to attain remaining goals.      []  See Plan of Care  [x]  See progress note/recertification  []  See Discharge Summary             PLAN  []  Upgrade activities as tolerated     []  Continue plan of care  [x]  Update interventions per flow sheet       []  Discharge due to:_  []  Other:_      Trevor Paul, PT, DPT 10/8/2020  10:54 AM

## 2020-10-08 NOTE — TELEPHONE ENCOUNTER
08/24/2020 rt tkr  Requesting refill for oxycod/acetamin to [facility] in [place]  She only uses them when she goes to PT

## 2020-10-08 NOTE — PROGRESS NOTES
[address], [address] [facility], [facility]  Phone: [phone]    Fax: [phone]   Progress Note/CONTINUED PLAN OF CARE for PHYSICAL THERAPY          Patient Name: Kim Storey : 1945   Treatment/Medical Diagnosis: Pain in right knee [M25.561]   Onset Date: 2020    Referral Source: Lorenzo Lundborg, MD [facility] 2020   Prior Hospitalization: See Medical History Provider #: [id number]   Prior Level of Function: Independent without AD   Comorbidities: HLD, OA, Vitamin D deficiency, Obesity   Medications: Verified on Patient Summary List   Visits from [facility]: 11 Missed Visits: 0       Short Term Goals:  1. Pt will be able to ambulate 100 feet on level surfaces in 3 weeks. MET. 2. Pt will be able to ambulate 25 steps with unilateral arm support and reciprocating step pattern in 3 weeks. MET. 3. Pt will report 0/10 pain when performing activities of daily living in 3 weeks. MET. Long Term Goals:  1. Pt will be able to ambulate community distances in 6 weeks. MET, pt states was able to walk for 20+ minutes in [place] without difficulty. 2. Pt will be able to ambulate unlimited steps within community in 6 weeks. MET.    3. Pt will report 0/10 pain when performing leisure activities in 6 weeks. Progressing, pt states that she is still having \"nawing every now & then\" described as an ache. Objective Measures:    LE strength: Left hip flexion: 4/5, knee 5/5, ankle 5/5; Right hip flexion 4-/5, knee 4+/5, ankle 5/5.   R knee ROM:  -15-92 degrees active, -15-97 degrees passive    Problem List: decrease ROM, decrease strength, impaired gait/ balance and decrease flexibility/ joint mobility         Assessment/ Patient Update: Pt is s/p R TKA on 2020 and has made measurable improvements in ROM, strength and functional mobility independence over past 6 weeks of rehabilitation even in lieu of break in attendance secondary to DVT. Pt will continue to benefit from skilled PT intervention to address deficits in effort to maximize pain-free daily activity & restore functional baseline independence & safety. Updated Plan of Care:    Treatment Plan to include the following per provider discretion: Therapeutic exercise, Therapeutic activities, Physical agent/modality, Gait/balance training, Manual therapy, Aquatic therapy and Functional mobility training    Frequency / Duration:  Patient to be seen   2-3   times per week for   4-6  weeks      If you have any questions/comments please contact us directly at [phone]. Thank you for allowing us to assist in the care of your patient. Therapist Signature: Kasey Harrell PT, DPT Date: 00/6/3093   Certification Period:  Reporting Period: 10/8/2020 - 12/28/2020 8/26/2020 - 10/8/2020 Time: 11:04 AM   NOTE TO PHYSICIAN:  PLEASE COMPLETE THE ORDERS BELOW AND FAX TO   [facility]: [phone]. If you are unable to process this request in 24 hours please contact our office: [phone].    ___ I have read the above report and request that my patient continue as recommended.   ___ I have read the above report and request that my patient continue therapy with the following changes/special instructions: ________________________________________________   ___ I have read the above report and request that my patient be discharged from therapy.      Physician Signature:        Date:       Time:

## 2020-10-12 ENCOUNTER — HOSPITAL ENCOUNTER (OUTPATIENT)
Dept: PHYSICAL THERAPY | Age: 75
Discharge: HOME | End: 2020-10-12
Payer: MEDICARE

## 2020-10-12 PROCEDURE — 97110 THERAPEUTIC EXERCISES: CPT

## 2020-10-12 PROCEDURE — 97016 VASOPNEUMATIC DEVICE THERAPY: CPT

## 2020-10-12 NOTE — PROGRESS NOTES
PT DAILY TREATMENT NOTE 8    Patient Name: Ranjeet Andre  Date:10/12/2020  : 1945  [x]  Patient  Verified  Payor: VA MEDICARE / Plan: VA MEDICARE PART A & B / Product Type: Medicare /    In time:1302  Out time:1405  Total Treatment Time (min): 63  Total Timed Codes (min): 53    Visit #: 12    Treatment Area: Pain in right knee [M25.561]    SUBJECTIVE  Pain Level (0-10 scale): 0/10  Any medication changes, allergies to medications, adverse drug reactions, diagnosis change, or new procedure performed?: [x] No    [] Yes (see summary sheet for update)  Subjective functional status/changes:   [] No changes reported  Pt arrival has no c/o pain today, states that she is 95% great and the other 5% is in her head. [employer] with no AD. OBJECTIVE  Modality rationale: decrease inflammation and decrease pain to decrease any pain for exercises and swelling pt may have. Min Type Additional Details    [] Estim: []Att   []Unatt  []TENS instruct                 []IFC  []Premod []NMES                       []Other:  []w/US   []w/ice   []w/heat  Position:  Location:    []  Traction: [] Cervical       []Lumbar                       [] Prone          []Supine                       []Intermittent   []Continuous Lbs:  [] before manual  [] after manual    []  Ultrasound: []Continuous   [] Pulsed                           []1MHz   []3MHz Location:  W/cm2:    []  Iontophoresis with dexamethasone         Location: [] Take home patch   [] In clinic    []  Ice     []  heat  []  Ice massage Position:  Location:   10 [x]  Vasopneumatic Device Pressure: [x] lo [] med [] hi   Temp: [x] lo [] med [] hi     53 min Therapeutic Exercise:  [x] See flow sheet :   Rationale: increase ROM, increase strength, improve coordination and improve balance to improve the patients ability to return to normal daily function.'    Began session today with active warm up on stepper, followed by stretches in bars, and strengthening per flow sheet. Extension stretch end of session to improve joint mobility. Vaso post session to reduce inflammation. min Patient Education: [x] Review HEP    [] Progressed/Changed HEP based on:   [] positioning   [] body mechanics   [] transfers   [] heat/ice application        Pain Level (0-10 scale) post treatment: 0/10    ASSESSMENT/Changes in Function: Pt performed full program today with no significant increase in pain. Will cont to work on ROM deficits and further strengthening. Patient will continue to benefit from skilled PT services to address functional mobility deficits, address ROM deficits and address strength deficits to attain remaining goals.      [x]  See Plan of Care  []  See progress note/recertification  []  See Discharge Summary             PLAN  []  Upgrade activities as tolerated     [x]  Continue plan of care  []  Update interventions per flow sheet       []  Discharge due to:_  []  Other:_      Terral Seip, LPTA  10/12/2020  1:59 PM

## 2020-10-14 ENCOUNTER — HOSPITAL ENCOUNTER (OUTPATIENT)
Dept: PHYSICAL THERAPY | Age: 75
Discharge: HOME | End: 2020-10-14
Payer: MEDICARE

## 2020-10-14 PROCEDURE — 97110 THERAPEUTIC EXERCISES: CPT

## 2020-10-14 PROCEDURE — 97016 VASOPNEUMATIC DEVICE THERAPY: CPT

## 2020-10-14 NOTE — PROGRESS NOTES
PT DAILY TREATMENT NOTE     Patient Name: Ayah Holt  Date:10/14/2020  : 1945  [x]  Patient  Verified  Payor: VA MEDICARE / Plan: VA MEDICARE PART A & B / Product Type: Medicare /    In time:1259  Out time:1400   Total Treatment Time (min): 61  Total Timed Codes (min): 41  1:1 Treatment Time (min): 41       Treatment Area: Pain in right knee [M25.561]    SUBJECTIVE  Pt denies pain upon arrival today, reports that she is also sleeping better.   Pain Level (0-10 scale): 0/10  Any medication changes, allergies to medications, adverse drug reactions, diagnosis change, or new procedure performed?: [x] No    [] Yes (see summary sheet for update)        OBJECTIVE  Modality rationale: increase tissue extensibility to improve the patients ability to move optimally   Min Type Additional Details    [] Estim: []Att   []Unatt  []TENS instruct                 []IFC  []Premod []NMES                       []Other:  []w/US   []w/ice   []w/heat  Position:  Location:    []  Traction: [] Cervical       []Lumbar                       [] Prone          []Supine                       []Intermittent   []Continuous Lbs:  [] before manual  [] after manual    []  Ultrasound: []Continuous   [] Pulsed                           []1MHz   []3MHz Location:  W/cm2:        10 []  Ice     [x]  heat  []  Ice massage Position: Heel prop  Location: R knee   10 [x]  Vasopneumatic Device Pressure: [x] lo [] med [] hi   Temp: [x] lo [] med [] hi         41 min Therapeutic Exercise:  [x] See flow sheet :   Rationale: increase ROM, increase strength and improve balance to improve the patients ability to move safe & independently             With TE Patient Education: [x] Review HEP    [] Progressed/Changed HEP based on:   [] positioning   [] body mechanics   [] transfers   [] heat/ice application          Pain Level (0-10 scale) post treatment: 0/10    ASSESSMENT/Changes in Function: Pt demonstrates improved balance today, ability to complete [place] stance without UE support and no LOB. Plan to progress towards unlevel surfaces next visit. Patient will continue to benefit from skilled PT services to modify and progress therapeutic interventions, address functional mobility deficits, address ROM deficits and address strength deficits to attain remaining goals.      [x]  See Plan of Care  []  See progress note/recertification  []  See Discharge Summary             PLAN  []  Upgrade activities as tolerated     [x]  Continue plan of care  []  Update interventions per flow sheet       []  Discharge due to:_  []  Other:_      Crystal Avila DPT 10/14/2020  12:51 PM

## 2020-10-16 ENCOUNTER — HOSPITAL ENCOUNTER (OUTPATIENT)
Dept: PHYSICAL THERAPY | Age: 75
Discharge: HOME | End: 2020-10-16
Payer: MEDICARE

## 2020-10-16 PROCEDURE — 97110 THERAPEUTIC EXERCISES: CPT

## 2020-10-16 NOTE — PROGRESS NOTES
PT DAILY TREATMENT NOTE 8    Patient Name: Jamaal Begum  Date:10/16/2020  : 1945  [x]  Patient  Verified  Payor: VA MEDICARE / Plan: VA MEDICARE PART A & B / Product Type: Medicare /    In time:1311 Out time:1411  Total Treatment Time (min): 60  Total Timed Codes (min): 50     Visit #: 14    Treatment Area: Pain in right knee [M25.561]    SUBJECTIVE  Pain Level (0-10 scale): 0/10  Any medication changes, allergies to medications, adverse drug reactions, diagnosis change, or new procedure performed?: [x] No    [] Yes (see summary sheet for update)  Subjective functional status/changes:   [] No changes reported  Pt reports no c/o pain on arrival today. OBJECTIVE  Modality rationale: decrease pain and increase tissue extensibility to improve the patients ability to return to normal function. Min Type Additional Details    [] Estim: []Att   []Unatt  []TENS instruct                 []IFC  []Premod []NMES                       []Other:  []w/US   []w/ice   []w/heat  Position:  Location:    []  Traction: [] Cervical       []Lumbar                       [] Prone          []Supine                       []Intermittent   []Continuous Lbs:  [] before manual  [] after manual    []  Ultrasound: []Continuous   [] Pulsed                           []1MHz   []3MHz Location:  W/cm2:    []  Iontophoresis with dexamethasone         Location: [] Take home patch   [] In clinic   10 []  Ice     [x]  heat  []  Ice massage Position: seated  Location:knee ext    []  Vasopneumatic Device Pressure: [] lo [] med [] hi   Temp: [] lo [] med [] hi       50 min Therapeutic Exercise:  [x] See flow sheet :   Rationale: increase ROM, increase strength and improve balance to improve the patients ability to ambulate safely and with least restrictive device. Began session with MH in extension, followed by stretches. Strengthening and ROM performed per flow sheet today with no increase in pain.             min Patient Education: [x] Review HEP    [] Progressed/Changed HEP based on:   [] positioning   [] body mechanics   [] transfers   [] heat/ice application             ASSESSMENT/Changes in Function: Pt making excellent progress, progressing quickly with strength. Gains. Patient will continue to benefit from skilled PT services to address functional mobility deficits, address ROM deficits and address strength deficits to attain remaining goals.      [x]  See Plan of Care  []  See progress note/recertification  []  See Discharge Summary               PLAN  []  Upgrade activities as tolerated     [x]  Continue plan of care  []  Update interventions per flow sheet       []  Discharge due to:_  []  Other:_      MELANIA Bashir   10/16/2020  2:32 PM

## 2020-10-19 ENCOUNTER — HOSPITAL ENCOUNTER (OUTPATIENT)
Dept: PHYSICAL THERAPY | Age: 75
Discharge: HOME | End: 2020-10-19
Payer: MEDICARE

## 2020-10-19 PROCEDURE — 97110 THERAPEUTIC EXERCISES: CPT

## 2020-10-19 PROCEDURE — 97016 VASOPNEUMATIC DEVICE THERAPY: CPT

## 2020-10-19 NOTE — PROGRESS NOTES
PT DAILY TREATMENT NOTE     Patient Name: Damari Palomo  Date:10/19/2020  : 1945  [x]  Patient  Verified  Payor: VA MEDICARE / Plan: VA MEDICARE PART A & B / Product Type: Medicare /    In time:1304  Out time:1424  Total Treatment Time (min): 70  Total Timed Codes (min): 60     Visit #: 15    Treatment Area: Pain in right knee [M25.561]    SUBJECTIVE  Pain Level (0-10 scale): 0/10  Any medication changes, allergies to medications, adverse drug reactions, diagnosis change, or new procedure performed?: [x] No    [] Yes (see summary sheet for update)  Subjective functional status/changes:   [] No changes reported  Pt reported that she is now able to sleep in her own bed and not have to move so much during the night. OBJECTIVE  Modality rationale: decrease pain and increase tissue extensibility to improve the patients ability to return to normal daily activity.    Min Type Additional Details    [] Estim: []Att   []Unatt  []TENS instruct                 []IFC  []Premod []NMES                       []Other:  []w/US   []w/ice   []w/heat  Position:  Location:    []  Traction: [] Cervical       []Lumbar                       [] Prone          []Supine                       []Intermittent   []Continuous Lbs:  [] before manual  [] after manual    []  Ultrasound: []Continuous   [] Pulsed                           []1MHz   []3MHz Location:  W/cm2:    []  Iontophoresis with dexamethasone         Location: [] Take home patch   [] In clinic   10 []  Ice     [x]  heat  []  Ice massage Position:seated  Location:r knee in extension   10 [x]  Vasopneumatic Device Pressure: [x] lo [] med [] hi   Temp: [x] lo [] med [] hi       50 min Therapeutic Exercise:  [x] See flow sheet :   Rationale: increase ROM, increase strength and improve balance to improve the patients ability to return to ambulating with no AD and improved balance and stability    Began session with MH seated in extension, followed by active warm-up on stepper to increase joint mobility. Pt performed strengthening exercises per flow sheet, introduced hip 3 way and [address] with 2# today. Stretches on slant box to improve muscle extensibility. Balance performed to improve stability on knee joint. min Patient Education: [x] Review HEP    [] Progressed/Changed HEP based on:   [] positioning   [] body mechanics   [] transfers   [] heat/ice application             Pain Level (0-10 scale) post treatment: 0/10    ASSESSMENT/Changes in Function: Pt making excellent progress with strength and mobility. Plan to progress as able. Patient will continue to benefit from skilled PT services to address functional mobility deficits, address ROM deficits and address strength deficits to attain remaining goals.      [x]  See Plan of Care  []  See progress note/recertification  []  See Discharge Summary             PLAN  []  Upgrade activities as tolerated     [x]  Continue plan of care  []  Update interventions per flow sheet       []  Discharge due to:_  []  Other:_      MELANIA Oakley  10/19/2020  2:40 PM

## 2020-10-21 ENCOUNTER — HOSPITAL ENCOUNTER (OUTPATIENT)
Dept: PHYSICAL THERAPY | Age: 75
Discharge: HOME | End: 2020-10-21
Payer: MEDICARE

## 2020-10-21 PROCEDURE — 97150 GROUP THERAPEUTIC PROCEDURES: CPT

## 2020-10-21 PROCEDURE — 97110 THERAPEUTIC EXERCISES: CPT

## 2020-10-21 PROCEDURE — 97016 VASOPNEUMATIC DEVICE THERAPY: CPT

## 2020-10-21 NOTE — PROGRESS NOTES
PT DAILY TREATMENT NOTE 8    Patient Name: Larisa Newell  Date:10/21/2020  : 1945  [x]  Patient  Verified  Payor: VA MEDICARE / Plan: VA MEDICARE PART A & B / Product Type: Medicare /    In time:1302 Out time:1411  Total Treatment Time (min): 69  Total Timed Codes (min): 59    Visit #: 16    Treatment Area: Pain in right knee [M25.561]    SUBJECTIVE  Pain Level (0-10 scale): 0/10  Any medication changes, allergies to medications, adverse drug reactions, diagnosis change, or new procedure performed?: [x] No    [] Yes (see summary sheet for update)  Subjective functional status/changes:   [] No changes reported  Pt reported no reports of pain, just tenderness in calf as she had a prior blood clot that has been treated. OBJECTIVE  Modality rationale: decrease pain and increase tissue extensibility to improve the patients ability to perform knee exercises pain free. Min Type Additional Details    [] Estim: []Att   []Unatt  []TENS instruct                 []IFC  []Premod []NMES                       []Other:  []w/US   []w/ice   []w/heat  Position:  Location:    []  Traction: [] Cervical       []Lumbar                       [] Prone          []Supine                       []Intermittent   []Continuous Lbs:  [] before manual  [] after manual    []  Ultrasound: []Continuous   [] Pulsed                           []1MHz   []3MHz Location:  W/cm2:    []  Iontophoresis with dexamethasone         Location: [] Take home patch   [] In clinic   10 []  Ice     [x]  heat  []  Ice massage Position:seated  Location:R knee   10 [x]  Vasopneumatic Device Pressure: [] lo [x] med [] hi   Temp: [x] lo [] med [] hi       49 min Therapeutic Exercise:  [x] See flow sheet :   Rationale: increase ROM, increase strength and improve balance to improve the patients ability to return to prior level of function. Began session with  in ext, followed by active warm up on stepper, followed by stretching and strengthening. min Patient Education: [x] Review HEP    [] Progressed/Changed HEP based on:   [] positioning   [] body mechanics   [] transfers   [] heat/ice application            Pain Level (0-10 scale) post treatment: 0/10    ASSESSMENT/Changes in Function: Pt making excellent progress with ROM and strength. Patient will continue to benefit from skilled PT services to address functional mobility deficits, address ROM deficits and address strength deficits to attain remaining goals.      [x]  See Plan of Care  []  See progress note/recertification  []  See Discharge Summary               PLAN  []  Upgrade activities as tolerated     [x]  Continue plan of care  []  Update interventions per flow sheet       []  Discharge due to:_  []  Other:_      MELANIA Guzman  10/21/2020  2:39 PM

## 2020-10-22 ENCOUNTER — OFFICE VISIT (OUTPATIENT)
Dept: ORTHOPEDIC SURGERY | Age: 75
End: 2020-10-22
Payer: MEDICARE

## 2020-10-22 DIAGNOSIS — M17.11 PRIMARY OSTEOARTHRITIS OF RIGHT KNEE: Primary | ICD-10-CM

## 2020-10-22 PROCEDURE — 99024 POSTOP FOLLOW-UP VISIT: CPT | Performed by: ORTHOPAEDIC SURGERY

## 2020-10-22 NOTE — PATIENT INSTRUCTIONS
Knee Pain or Injury: Care Instructions Your Care Instructions Injuries are a common cause of knee problems. Sudden (acute) injuries may be caused by a direct blow to the knee. They can also be caused by abnormal twisting, bending, or falling on the knee. Pain, bruising, or swelling may be severe, and may start within minutes of the injury. Overuse is another cause of knee pain. Other causes are climbing stairs, kneeling, and other activities that use the knee. Everyday wear and tear, especially as you get older, also can cause knee pain. Rest, along with home treatment, often relieves pain and allows your knee to heal. If you have a serious knee injury, you may need tests and treatment. Follow-up care is a key part of your treatment and safety. Be sure to make and go to all appointments, and call your doctor if you are having problems. It's also a good idea to know your test results and keep a list of the medicines you take. How can you care for yourself at home? · Be safe with medicines. Read and follow all instructions on the label. ? If the doctor gave you a prescription medicine for pain, take it as prescribed. ? If you are not taking a prescription pain medicine, ask your doctor if you can take an over-the-counter medicine. · Rest and protect your knee. Take a break from any activity that may cause pain. · Put ice or a cold pack on your knee for 10 to 20 minutes at a time. Put a thin cloth between the ice and your skin. · Prop up a sore knee on a pillow when you ice it or anytime you sit or lie down for the next 3 days. Try to keep it above the level of your heart. This will help reduce swelling. · If your knee is not swollen, you can put moist heat, a heating pad, or a warm cloth on your knee. · If your doctor recommends an elastic bandage, sleeve, or other type of support for your knee, wear it as directed.  
· Follow your doctor's instructions about how much weight you can put on your leg. Use a cane, crutches, or a walker as instructed. · Follow your doctor's instructions about activity during your healing process. If you can do mild exercise, slowly increase your activity. · Reach and stay at a healthy weight. Extra weight can strain the joints, especially the knees and hips, and make the pain worse. Losing even a few pounds may help. When should you call for help? Call 911 anytime you think you may need emergency care. For example, call if: 
  · You have symptoms of a blood clot in your lung (called a pulmonary embolism). These may include: 
? Sudden chest pain. ? Trouble breathing. ? Coughing up blood. Call your doctor now or seek immediate medical care if: 
  · You have severe or increasing pain.  
  · Your leg or foot turns cold or changes color.  
  · You cannot stand or put weight on your knee.  
  · Your knee looks twisted or bent out of shape.  
  · You cannot move your knee.  
  · You have signs of infection, such as: 
? Increased pain, swelling, warmth, or redness. ? Red streaks leading from the knee. ? Pus draining from a place on your knee. ? A fever.  
  · You have signs of a blood clot in your leg (called a deep vein thrombosis), such as: 
? Pain in your calf, back of the knee, thigh, or groin. ? Redness and swelling in your leg or groin. Watch closely for changes in your health, and be sure to contact your doctor if: 
  · You have tingling, weakness, or numbness in your knee.  
  · You have any new symptoms, such as swelling.  
  · You have bruises from a knee injury that last longer than 2 weeks.  
  · You do not get better as expected. Where can you learn more? Go to http://www.gray.com/ Enter K195 in the search box to learn more about \"Knee Pain or Injury: Care Instructions. \" Current as of: June 26, 2019               Content Version: 12.6 © 3402-7303 [employer]. Care instructions adapted under license by "[employer]" (which disclaims liability or warranty for this information). If you have questions about a medical condition or this instruction, always ask your healthcare professional. [address] any warranty or liability for your use of this information.

## 2020-10-22 NOTE — PROGRESS NOTES
Name: Delicia Le    : 1945     Service Dept: [address] [place] [address] and Sports Medicine    Patient's Pharmacies:    [facility] [address] [place] [address]  [place] [address]  Phone: [phone] Fax: [phone]       Chief Complaint   Patient presents with    Knee Pain        There were no vitals taken for this visit. No Known Allergies     Current Outpatient Medications   Medication Sig Dispense Refill    rivaroxaban (Xarelto) 15 mg (42)- 20 mg (9) DsPk Take one 15 mg tablet twice a day with food for the first 21 days. Then, take one 20 mg tablet once a day with food for 9 days. 1 Dose Pack 0    varicella-zoster recombinant, PF, (Shingrix, PF,) 50 mcg/0.5 mL susr injection 0.5 mL by IntraMUSCular route once.  atorvastatin (LIPITOR) 10 mg tablet Take 10 mg by mouth At bedtime.  trospium (SANCTURA) 20 mg tablet Take 1 Tab by mouth two (2) times a day. 90 Tab 3    Glucosamine &Chondroit-MV-Min3 586-704-55-0.5 mg tab Take  by mouth.  multivitamins-minerals-lutein (CENTRUM SILVER) tab Take  by mouth.  omega-3 fatty acids-vitamin e (FISH OIL) 1,000 mg cap Take 1 Cap by mouth. Patient Active Problem List   Diagnosis Code    Diverticulosis of intestine K57.90    Dysplastic colon polyp K63.5    Cramps of lower extremity R25.2    Adiposity E66.9    Hyperlipidemia E78.5    Primary localized osteoarthrosis M19.91    Renal cell cancer ([address] C64.9    Simple renal cyst N28.1    Vitamin D deficiency E55.9    Severe obesity (BMI 35.0-39. 9) with comorbidity ([address] E66.01    Primary osteoarthritis involving multiple joints M89.49    History of renal cell carcinoma Z85.528        Family History   Problem Relation Age of Onset    Heart Disease Mother     Cancer Father     Cancer Paternal Aunt     Cancer Paternal Uncle         Social History     Socioeconomic History    Marital status:      Spouse name: Not on file  Number of children: Not on file    Years of education: Not on file    Highest education level: Not on file   Tobacco Use    Smoking status: Never Smoker    Smokeless tobacco: Never Used   Substance and Sexual Activity    Alcohol use: No    Drug use: No   Other Topics Concern        Past Surgical History:   Procedure Laterality Date    HX CATARACT REMOVAL      HX CATARACT REMOVAL  10/2013    Right Eye    HX  SECTION      HX CHOLECYSTECTOMY      HX NEPHRECTOMY Right 10/06/95    Dr. Gino Aldridge, [address]    HX TUBAL LIGATION          Past Medical History:   Diagnosis Date    Arthritis     Colon polyp     Kidney stones     Piercing     Renal cell carcinoma of right kidney ([address] 10/6/95    Unknown Pathological Stage RCC with unknown FG s/p right radical nephrectomy at [facility] in      Renal disorder         I have reviewed and agree with [address] and ROS and intake form in chart and the record. Review of Systems:   Patient is a pleasant appearing individual, appropriately dressed, well hydrated, well nourished, who is alert, appropriately oriented for age, and in no acute distress with a normal gait and normal affect who does not appear to be in any significant pain. Physical Exam:  Right knee - Neurovascularly intact with good cap refill, full range of motion and full strength, well healed incision noted, no swelling, no erythema, no instability. Left knee - Decrease range of motion with flexion, Some crepitation, Grossly neurovascularly intact, Good cap refill, No skin lesion, Moderate swelling, No gross instability, Some quadriceps weakness         Encounter Diagnoses     ICD-10-CM ICD-9-CM   1. Primary osteoarthritis of right knee  M17.11 715.16          HPI:  The patient is here status post right total knee replacement, doing well, had DVT. Dr. Dee Triana is following for Xarelto with that. Assessment/Plan:  Plan at this point, activities as tolerated started, no restrictions.   We will see the patient back on as needed basis and a yearly appointment and go from there. Return to Office: Follow-up and Dispositions    · Return in about 1 year (around 10/22/2021) for w/ Xrays. Scribed by Thais Mena as dictated by Brenda Ramírez. Beth Gilmore MD.    Documentation True and Accepted Chiki Gilmore MD

## 2020-10-23 ENCOUNTER — HOSPITAL ENCOUNTER (OUTPATIENT)
Dept: PHYSICAL THERAPY | Age: 75
Discharge: HOME | End: 2020-10-23
Payer: MEDICARE

## 2020-10-23 PROCEDURE — 97110 THERAPEUTIC EXERCISES: CPT

## 2020-10-23 PROCEDURE — 97016 VASOPNEUMATIC DEVICE THERAPY: CPT

## 2020-10-23 NOTE — PROGRESS NOTES
PT DAILY TREATMENT NOTE     Patient Name: Damari Palomo  DQTK:  : 1945  [x]  Patient  Verified  Payor: [place] MEDICARE / Plan: [place] MEDICARE PART A & B / Product Type: Medicare /    In time:1303  Out time:1405  Total Treatment Time (min): 62  Total Timed Codes (min): 42  1:1 Treatment Time (min): 42       Treatment Area: Pain in right knee [M25.561]    SUBJECTIVE  Pt denies complaints of pain upon arrival today.    Pain Level (0-10 scale): 0/10  Any medication changes, allergies to medications, adverse drug reactions, diagnosis change, or new procedure performed?: [x] No    [] Yes (see summary sheet for update)        OBJECTIVE  Modality rationale: increase tissue extensibility to improve the patients ability to participate in session   Min Type Additional Details    [] Estim: []Att   []Unatt  []TENS instruct                 []IFC  []Premod []NMES                       []Other:  []w/US   []w/ice   []w/heat  Position:  Location:    []  Traction: [] Cervical       []Lumbar                       [] Prone          []Supine                       []Intermittent   []Continuous Lbs:  [] before manual  [] after manual    []  Ultrasound: []Continuous   [] Pulsed                           []1MHz   []3MHz Location:  W/cm2:        10 []  Ice     [x]  heat  []  Ice massage Position: seated with heel prop  Location: R knee   10 [x]  Vasopneumatic Device Pressure: [x] lo [] med [] hi   Temp: [x] lo [] med [] hi         42 min Therapeutic Exercise:  [x] See flow sheet :   Rationale: increase ROM, increase strength and improve balance to improve the patients ability to restore functional mobility safety & independence           With TE Patient Education: [x] Review HEP    [] Progressed/Changed HEP based on:   [] positioning   [] body mechanics   [] transfers   [] heat/ice application          Pain Level (0-10 scale) post treatment: 0/10    ASSESSMENT/Changes in Function: Pt shows good recall with all exercises today. Plan to formally reassess next week & transition towards HEP and discharge. Patient will continue to benefit from skilled PT services to modify and progress therapeutic interventions, address functional mobility deficits, address ROM deficits, address strength deficits and analyze and cue movement patterns to attain remaining goals.      [x]  See Plan of Care  []  See progress note/recertification  []  See Discharge Summary             PLAN  [x]  Upgrade activities as tolerated     [x]  Continue plan of care  []  Update interventions per flow sheet       []  Discharge due to:_  []  Other:_      Samson Macedo PT, DPT 10/23/2020  1:12 PM

## 2020-10-26 ENCOUNTER — HOSPITAL ENCOUNTER (OUTPATIENT)
Dept: PHYSICAL THERAPY | Age: 75
Discharge: HOME | End: 2020-10-26
Payer: MEDICARE

## 2020-10-26 PROCEDURE — 97110 THERAPEUTIC EXERCISES: CPT

## 2020-10-26 NOTE — PROGRESS NOTES
PT DAILY TREATMENT NOTE 8-14    Patient Name: Robin Torre  Date:10/26/2020  : 1945  [x]  Patient  Verified  Payor: [place] MEDICARE / Plan: [place] MEDICARE PART A & B / Product Type: Medicare /    In time:1304  Out time:1356  Total Treatment Time (min): 52  Total Timed Codes (min): 52    Visit #: 18    Treatment Area: Pain in right knee [M25.561]    SUBJECTIVE  Pain Level (0-10 scale): 0/10  Any medication changes, allergies to medications, adverse drug reactions, diagnosis change, or new procedure performed?: [x] No    [] Yes (see summary sheet for update)  Subjective functional status/changes:   [] No changes reported  Pt arrival no c/o pain today. OBJECTIVE    52 min Therapeutic Exercise:  [x] See flow sheet :   Rationale: increase ROM, increase strength and improve balance to improve the patent's strength to return to PLOF. min Patient Education: [x] Review HEP    [] Progressed/Changed HEP based on:   [] positioning   [] body mechanics   [] transfers   [] heat/ice application          Pain Level (0-10 scale) post treatment: 0/10    ASSESSMENT/Changes in Function: Pt has made excellent progress and fells that she is where she needs to be to return to her normal function. Possible DC next session if no new issues. Patient will continue to benefit from skilled PT services to address functional mobility deficits, address ROM deficits and address strength deficits to attain remaining goals.      [x]  See Plan of Care  []  See progress note/recertification  []  See Discharge Summary             PLAN  []  Upgrade activities as tolerated     [x]  Continue plan of care  []  Update interventions per flow sheet       []  Discharge due to:_  []  Other:_      MELANIA Treviño  10/26/2020  5:32 PM

## 2020-10-29 ENCOUNTER — HOSPITAL ENCOUNTER (OUTPATIENT)
Dept: PHYSICAL THERAPY | Age: 75
Discharge: HOME | End: 2020-10-29
Payer: MEDICARE

## 2020-10-29 PROCEDURE — 97110 THERAPEUTIC EXERCISES: CPT

## 2020-10-29 NOTE — PROGRESS NOTES
PT DAILY TREATMENT NOTE 8-14    Patient Name: Katerine Avila  Date:10/29/2020  : 1945  [x]  Patient  Verified  Payor: [place] MEDICARE / Plan: [place] MEDICARE PART A & B / Product Type: Medicare /    In time:1257  Out time:1323  Total Treatment Time (min): 26  Total Timed Codes (min): 26  1:1 Treatment Time (min): 26       Treatment Area: Pain in right knee [M25.561]    SUBJECTIVE  Pt denies complaints in R knee upon arrival today, states that she is able to do anything she wants to do at this time. Pain Level (0-10 scale): 0/10  Any medication changes, allergies to medications, adverse drug reactions, diagnosis change, or new procedure performed?: [x] No    [] Yes (see summary sheet for update)        OBJECTIVE    26 min Therapeutic Exercise:  [x] See flow sheet :   Rationale: increase ROM and increase strength to improve the patients ability to move with optimal functional independence              With TE Patient Education: [x] Review HEP    [] Progressed/Changed HEP based on:   [] positioning   [] body mechanics   [] transfers   [] heat/ice application          Pain Level (0-10 scale) post treatment: 0/10    ASSESSMENT/Changes in Function: Pt seen today for reassessment of LE strength, R knee ROM, functional mobility & HEP review.      []  See Plan of Care  []  See progress note/recertification  [x]  See Discharge Summary             PLAN  []  Upgrade activities as tolerated     []  Continue plan of care  []  Update interventions per flow sheet       [x]  Discharge due to: personal & functional goals achieved  []  Other:_      Ioana Isbell, PT, DPT 10/29/2020  12:51 PM

## 2020-10-29 NOTE — PROGRESS NOTES
[address] [place] [address] PHYSICAL THERAPY  [address] [place] [address] Dr JOY, [address] [facility], [facility] * Phone: [phone] * Fax: [phone]  DISCHARGE SUMMARY FOR PHYSICAL THERAPY          Patient Name: Jean-Pierre Prasad : 1945   Treatment/Medical Diagnosis: Pain in right knee [M25.561]   Onset Date: 2020    Referral Source: Shawn Arzola MD [facility]): 2020   Prior Hospitalization: See prior medical history Provider #: [id number]   Prior Level of Function: Independent without AD   Comorbidities: HLD, OA, Vitamin D deficiency, Obesity   Medications: Verified on Patient Summary List   Visits from [facility]: 19 Missed Visits: 0     Subjective:  Pt denies complaints in R knee upon arrival today, states that she is able to do anything she wants to do at this time. Objective:        AROM   PROM   MMT    Left Right Left Right Left Right   Hip Flexion     4+/5 4-/5    Extension          Abduction          IR/ER         Knee Flexion 102  108  5/5 5/5    Extension -15  -10  5/5 5/5   Ankle Plantarflexion     5/5 5/5    Dorsiflexion  Inversion  Eversion      5/5 5/5         Goal Status Final:  Short Term Goals:  1. Pt will be able to ambulate 100 feet on level surfaces in 3 weeks. MET, 10/8/2020     2. Pt will be able to ambulate 25 steps with unilateral arm support and reciprocating step pattern in 3 weeks. MET, 10/8/2020    3. Pt will report 0/10 pain when performing activities of daily living in 3 weeks. MET, 10/8/2020             Long Term Goals:  1. Pt will be able to ambulate community distances in 6 weeks. MET, 10/8/2020    2. Pt will be able to ambulate unlimited steps within community in 6 weeks. MET, 10/8/2020    3. Pt will report 0/10 pain when performing leisure activities in 6 weeks. MET. Pt denies any complaints of pain over the past several weeks and reports pain-free days at home.      Key Functional Changes/Progress: Pt is 8 weeks s/p R TKA and has made measurable improvements in ROM, strength and functional independence. Pt is independent with HEP and aware of ability to return for services in the future as needed. Assessments/Recommendations: Discontinue therapy. Progressing towards or have reached established goals. If you have any questions/comments please contact us directly at [phone]. Thank you for allowing us to assist in the care of your patient. Therapist Signature: Ioana Isbell PT, DPT Date: 10/29/2020   Reporting Period: 8/26/2020 - 10/29/2020 Time: 4:15 PM      Certification Period: 8/26/2020 - 12/28/2020       NOTE TO PHYSICIAN:  PLEASE COMPLETE THE ORDERS BELOW AND FAX TO   [facility]: [phone]. If you are unable to process this request in 24 hours please contact our office: [phone].    ___ I have read the above report and request that my patient be discharged from therapy.      Physician Signature:        Date:       Time:

## 2021-09-21 ENCOUNTER — OFFICE VISIT (OUTPATIENT)
Dept: ORTHOPEDIC SURGERY | Age: 76
End: 2021-09-21

## 2021-09-21 DIAGNOSIS — M17.11 OSTEOARTHRITIS OF RIGHT KNEE, UNSPECIFIED OSTEOARTHRITIS TYPE: Primary | ICD-10-CM

## 2021-09-21 PROCEDURE — G8400 PT W/DXA NO RESULTS DOC: HCPCS | Performed by: ORTHOPAEDIC SURGERY

## 2021-09-21 PROCEDURE — 3017F COLORECTAL CA SCREEN DOC REV: CPT | Performed by: ORTHOPAEDIC SURGERY

## 2021-09-21 PROCEDURE — 1101F PT FALLS ASSESS-DOCD LE1/YR: CPT | Performed by: ORTHOPAEDIC SURGERY

## 2021-09-21 PROCEDURE — 1090F PRES/ABSN URINE INCON ASSESS: CPT | Performed by: ORTHOPAEDIC SURGERY

## 2021-09-21 PROCEDURE — G8432 DEP SCR NOT DOC, RNG: HCPCS | Performed by: ORTHOPAEDIC SURGERY

## 2021-09-21 PROCEDURE — G8417 CALC BMI ABV UP PARAM F/U: HCPCS | Performed by: ORTHOPAEDIC SURGERY

## 2021-09-21 PROCEDURE — G8536 NO DOC ELDER MAL SCRN: HCPCS | Performed by: ORTHOPAEDIC SURGERY

## 2021-09-21 PROCEDURE — 99213 OFFICE O/P EST LOW 20 MIN: CPT | Performed by: ORTHOPAEDIC SURGERY

## 2021-09-21 PROCEDURE — G8427 DOCREV CUR MEDS BY ELIG CLIN: HCPCS | Performed by: ORTHOPAEDIC SURGERY

## 2021-09-21 RX ORDER — RIVAROXABAN 20 MG/1
TABLET, FILM COATED ORAL
COMMUNITY
Start: 2021-09-13

## 2021-09-21 NOTE — LETTER
9/22/2021    Patient: Александр Lunsford   YOB: 1945   Date of Visit: 9/21/2021     Jennifer Hernandez MD  [address] [facility] [address] [facility] [address]  Via Fax: [phone]    Dear Jennifer Hernandez MD,      Thank you for referring Ms. Neto Olivarez to [address] [place] [address] AND SPOR[facility] for evaluation. My notes for this consultation are attached. If you have questions, please do not hesitate to call me. I look forward to following your patient along with you.       Sincerely,    Jerrell Wolf MD

## 2021-09-21 NOTE — PATIENT INSTRUCTIONS
Knee Pain or Injury: Care Instructions  Your Care Instructions     Injuries are a common cause of knee problems. Sudden (acute) injuries may be caused by a direct blow to the knee. They can also be caused by abnormal twisting, bending, or falling on the knee. Pain, bruising, or swelling may be severe, and may start within minutes of the injury. Overuse is another cause of knee pain. Other causes are climbing stairs, kneeling, and other activities that use the knee. Everyday wear and tear, especially as you get older, also can cause knee pain. Rest, along with home treatment, often relieves pain and allows your knee to heal. If you have a serious knee injury, you may need tests and treatment. Follow-up care is a key part of your treatment and safety. Be sure to make and go to all appointments, and call your doctor if you are having problems. It's also a good idea to know your test results and keep a list of the medicines you take. How can you care for yourself at home? · Be safe with medicines. Read and follow all instructions on the label. ? If the doctor gave you a prescription medicine for pain, take it as prescribed. ? If you are not taking a prescription pain medicine, ask your doctor if you can take an over-the-counter medicine. · Rest and protect your knee. Take a break from any activity that may cause pain. · Put ice or a cold pack on your knee for 10 to 20 minutes at a time. Put a thin cloth between the ice and your skin. · Prop up a sore knee on a pillow when you ice it or anytime you sit or lie down for the next 3 days. Try to keep it above the level of your heart. This will help reduce swelling. · If your knee is not swollen, you can put moist heat, a heating pad, or a warm cloth on your knee. · If your doctor recommends an elastic bandage, sleeve, or other type of support for your knee, wear it as directed.   · Follow your doctor's instructions about how much weight you can put on your leg. Use a cane, crutches, or a walker as instructed. · Follow your doctor's instructions about activity during your healing process. If you can do mild exercise, slowly increase your activity. · Reach and stay at a healthy weight. Extra weight can strain the joints, especially the knees and hips, and make the pain worse. Losing even a few pounds may help. When should you call for help? Call 911 anytime you think you may need emergency care. For example, call if:    · You have symptoms of a blood clot in your lung (called a pulmonary embolism). These may include:  ? Sudden chest pain. ? Trouble breathing. ? Coughing up blood. Call your doctor now or seek immediate medical care if:    · You have severe or increasing pain.     · Your leg or foot turns cold or changes color.     · You cannot stand or put weight on your knee.     · Your knee looks twisted or bent out of shape.     · You cannot move your knee.     · You have signs of infection, such as:  ? Increased pain, swelling, warmth, or redness. ? Red streaks leading from the knee. ? Pus draining from a place on your knee. ? A fever.     · You have signs of a blood clot in your leg (called a deep vein thrombosis), such as:  ? Pain in your calf, back of the knee, thigh, or groin. ? Redness and swelling in your leg or groin. Watch closely for changes in your health, and be sure to contact your doctor if:    · You have tingling, weakness, or numbness in your knee.     · You have any new symptoms, such as swelling.     · You have bruises from a knee injury that last longer than 2 weeks.     · You do not get better as expected. Where can you learn more? Go to http://www.gray.com/  Enter K195 in the search box to learn more about \"Knee Pain or Injury: Care Instructions. \"  Current as of: July 1, 2021               Content Version: 13.0  © 4641-7243 [employer].    Care instructions adapted under license by [employer] (which disclaims liability or warranty for this information). If you have questions about a medical condition or this instruction, always ask your healthcare professional. [address] any warranty or liability for your use of this information.

## 2021-09-21 NOTE — PROGRESS NOTES
Name: Slava De Dios    : 1945     Service Dept: [address] [place] [address] and Sports Medicine    Patient's Pharmacies:    [address] [place] [address]  Phone: [phone] Fax: [phone]       Chief Complaint   Patient presents with    Knee Pain        There were no vitals taken for this visit. No Known Allergies   Current Outpatient Medications   Medication Sig Dispense Refill    Xarelto 20 mg tab tablet TAKE 1 TABLET BY MOUTH ONCE DAILY FOR 90 DAYS      alendronate (FOSAMAX) 70 mg tablet TAKE 1 TABLET BY MOUTH ONCE A WEEK (7 DAYS ) (Patient not taking: Reported on 2021)      calcium-cholecalciferol, D3, (CALTRATE 600+D) tablet Take 1 Tablet by mouth daily.  atorvastatin (LIPITOR) 10 mg tablet Take 10 mg by mouth At bedtime.  trospium (SANCTURA) 20 mg tablet Take 1 Tab by mouth two (2) times a day. (Patient not taking: Reported on 2021) 90 Tab 3    Glucosamine &Chondroit-MV-Min3 114-550-46-0.5 mg tab Take  by mouth.  multivitamins-minerals-lutein (CENTRUM SILVER) tab Take  by mouth.  omega-3 fatty acids-vitamin e (FISH OIL) 1,000 mg cap Take 1 Cap by mouth. Patient Active Problem List   Diagnosis Code    Diverticulosis of intestine K57.90    Dysplastic colon polyp K63.5    Cramps of lower extremity R25.2    Adiposity E66.9    Hyperlipidemia E78.5    Primary localized osteoarthrosis M19.91    Renal cell cancer ([address] C64.9    Simple renal cyst N28.1    Vitamin D deficiency E55.9    Severe obesity (BMI 35.0-39. 9) with comorbidity ([address] E66.01    Primary osteoarthritis involving multiple joints M89.49    History of renal cell carcinoma Z85.528      Family History   Problem Relation Age of Onset    Heart Disease Mother     Cancer Father     Cancer Paternal Aunt     Cancer Paternal Uncle       Social History     Socioeconomic History    Marital status:      Spouse name: Not on file    Number of children: Not on file    Years of education: Not on file    Highest education level: Not on file   Tobacco Use    Smoking status: Never Smoker    Smokeless tobacco: Never Used   Substance and Sexual Activity    Alcohol use: No    Drug use: No   Other Topics Concern     Social Determinants of Health     Financial Resource Strain:     Difficulty of Paying Living Expenses:    Food Insecurity:     Worried About Running Out of Food in the Last Year:     [address] in the Last Year:    Transportation Needs:     Lack of Transportation (Medical):  Lack of Transportation (Non-Medical):    Physical Activity:     Days of Exercise per Week:     Minutes of Exercise per Session:    Stress:     Feeling of Stress :    Social Connections:     Frequency of Communication with Friends and Family:     Frequency of Social Gatherings with Friends and Family:     Attends Jain Services:     Active Member of Clubs or Organizations:     Attends Club or Organization Meetings:     Marital Status:       Past Surgical History:   Procedure Laterality Date    HX CATARACT REMOVAL      HX CATARACT REMOVAL  10/2013    Right Eye    HX  SECTION      HX CHOLECYSTECTOMY      HX NEPHRECTOMY Right 10/06/95    [facility]    HX TUBAL LIGATION        Past Medical History:   Diagnosis Date    Arthritis     Colon polyp     Kidney stones     Piercing     Renal cell carcinoma of right kidney ([facility] [address] 10/6/95    Unknown Pathological Stage RCC with unknown FG s/p right radical nephrectomy at [facility] in      Renal disorder         I have reviewed and agree with [address] and ROS and intake form in chart and the record furthermore I have reviewed prior medical record(s) regarding this patients care during this appointment.      Review of Systems:   Patient is a pleasant appearing individual, appropriately dressed, well hydrated, well nourished, who is alert, appropriately oriented for age, and in no acute distress with a normal gait and normal affect who does not appear to be in any significant pain. Physical Exam:  Right knee - Neurovascularly intact with good cap refill, full range of motion and full strength, well healed incision noted, no swelling, no erythema, no instability. Left knee - Decrease range of motion with flexion, Some crepitation, Grossly neurovascularly intact, Good cap refill, No skin lesion, Moderate swelling, No gross instability, Some quadriceps weakness   Encounter Diagnoses     ICD-10-CM ICD-9-CM   1. Osteoarthritis of right knee, unspecified osteoarthritis type  M17.11 715.96       HPI:  The patient is here status post right total knee replacement, doing well. Pain is 0/10 unless it flares up. X-rays show well-placed prosthesis with no new hardware failure. Assessment/Plan:  Plan at this point, activities as tolerated, weightbearing started. No restrictions from my standpoint. We will see the patient back on yearly appointment. As part of continued conservative pain management options the patient was advised to utilize Tylenol or OTC NSAIDS as long as it is not medically contraindicated. Return to Office: Follow-up and Dispositions    · Return in about 1 year (around 9/21/2022). Scribed by Felipe Burnett LPN as dictated by [facility] [place] OLY Martínez MD.  Documentation True and Accepted Chiki Martínez MD